# Patient Record
Sex: FEMALE | Race: WHITE | NOT HISPANIC OR LATINO | Employment: OTHER | URBAN - METROPOLITAN AREA
[De-identification: names, ages, dates, MRNs, and addresses within clinical notes are randomized per-mention and may not be internally consistent; named-entity substitution may affect disease eponyms.]

---

## 2022-03-08 ENCOUNTER — APPOINTMENT (OUTPATIENT)
Dept: GENERAL RADIOLOGY | Facility: HOSPITAL | Age: 72
End: 2022-03-08

## 2022-03-08 ENCOUNTER — APPOINTMENT (OUTPATIENT)
Dept: MRI IMAGING | Facility: HOSPITAL | Age: 72
End: 2022-03-08

## 2022-03-08 ENCOUNTER — APPOINTMENT (OUTPATIENT)
Dept: CT IMAGING | Facility: HOSPITAL | Age: 72
End: 2022-03-08

## 2022-03-08 ENCOUNTER — HOSPITAL ENCOUNTER (INPATIENT)
Facility: HOSPITAL | Age: 72
LOS: 2 days | Discharge: NURSING, REHAB OR LONG-TERM CARE | End: 2022-03-11
Attending: EMERGENCY MEDICINE | Admitting: FAMILY MEDICINE

## 2022-03-08 DIAGNOSIS — R53.1 ACUTE RIGHT-SIDED WEAKNESS: Primary | ICD-10-CM

## 2022-03-08 DIAGNOSIS — G47.00 INSOMNIA, UNSPECIFIED TYPE: ICD-10-CM

## 2022-03-08 PROBLEM — I10 ESSENTIAL HYPERTENSION: Status: ACTIVE | Noted: 2022-03-08

## 2022-03-08 PROBLEM — E11.9 DM2 (DIABETES MELLITUS, TYPE 2): Status: ACTIVE | Noted: 2022-03-08

## 2022-03-08 LAB
ALT SERPL W P-5'-P-CCNC: 10 U/L (ref 1–33)
APTT PPP: 24.5 SECONDS (ref 22–39)
AST SERPL-CCNC: 10 U/L (ref 1–32)
BASE EXCESS BLDA CALC-SCNC: 3 MMOL/L (ref -5–5)
BASOPHILS # BLD AUTO: 0.05 10*3/MM3 (ref 0–0.2)
BASOPHILS NFR BLD AUTO: 0.5 % (ref 0–1.5)
CA-I BLDA-SCNC: 1.19 MMOL/L (ref 1.2–1.32)
CO2 BLDA-SCNC: 27 MMOL/L (ref 24–29)
CREAT BLDA-MCNC: 1 MG/DL (ref 0.6–1.3)
DEPRECATED RDW RBC AUTO: 39.5 FL (ref 37–54)
EOSINOPHIL # BLD AUTO: 0.05 10*3/MM3 (ref 0–0.4)
EOSINOPHIL NFR BLD AUTO: 0.5 % (ref 0.3–6.2)
ERYTHROCYTE [DISTWIDTH] IN BLOOD BY AUTOMATED COUNT: 12.7 % (ref 12.3–15.4)
GLUCOSE BLDC GLUCOMTR-MCNC: 131 MG/DL (ref 70–130)
GLUCOSE BLDC GLUCOMTR-MCNC: 142 MG/DL (ref 70–130)
HCO3 BLDA-SCNC: 26.4 MMOL/L (ref 22–26)
HCT VFR BLD AUTO: 36.9 % (ref 34–46.6)
HCT VFR BLDA CALC: 39 % (ref 38–51)
HGB BLD-MCNC: 12.9 G/DL (ref 12–15.9)
HGB BLDA-MCNC: 13.3 G/DL (ref 12–17)
HOLD SPECIMEN: NORMAL
IMM GRANULOCYTES # BLD AUTO: 0.05 10*3/MM3 (ref 0–0.05)
IMM GRANULOCYTES NFR BLD AUTO: 0.5 % (ref 0–0.5)
LYMPHOCYTES # BLD AUTO: 1.65 10*3/MM3 (ref 0.7–3.1)
LYMPHOCYTES NFR BLD AUTO: 15.9 % (ref 19.6–45.3)
MCH RBC QN AUTO: 30.2 PG (ref 26.6–33)
MCHC RBC AUTO-ENTMCNC: 35 G/DL (ref 31.5–35.7)
MCV RBC AUTO: 86.4 FL (ref 79–97)
MONOCYTES # BLD AUTO: 0.62 10*3/MM3 (ref 0.1–0.9)
MONOCYTES NFR BLD AUTO: 6 % (ref 5–12)
NEUTROPHILS NFR BLD AUTO: 7.93 10*3/MM3 (ref 1.7–7)
NEUTROPHILS NFR BLD AUTO: 76.6 % (ref 42.7–76)
NRBC BLD AUTO-RTO: 0 /100 WBC (ref 0–0.2)
PCO2 BLDA: 36 MM HG (ref 35–45)
PH BLDA: 7.47 PH UNITS (ref 7.35–7.6)
PLATELET # BLD AUTO: 356 10*3/MM3 (ref 140–450)
PMV BLD AUTO: 9.7 FL (ref 6–12)
PO2 BLDA: 40 MMHG (ref 80–105)
POTASSIUM BLDA-SCNC: 4.5 MMOL/L (ref 3.5–4.9)
QT INTERVAL: 384 MS
QTC INTERVAL: 467 MS
RBC # BLD AUTO: 4.27 10*6/MM3 (ref 3.77–5.28)
SAO2 % BLDA: 79 % (ref 95–98)
SODIUM BLD-SCNC: 139 MMOL/L (ref 138–146)
TROPONIN T SERPL-MCNC: <0.01 NG/ML (ref 0–0.03)
WBC NRBC COR # BLD: 10.35 10*3/MM3 (ref 3.4–10.8)
WHOLE BLOOD HOLD SPECIMEN: NORMAL
WHOLE BLOOD HOLD SPECIMEN: NORMAL

## 2022-03-08 PROCEDURE — G0378 HOSPITAL OBSERVATION PER HR: HCPCS

## 2022-03-08 PROCEDURE — 99223 1ST HOSP IP/OBS HIGH 75: CPT

## 2022-03-08 PROCEDURE — 82803 BLOOD GASES ANY COMBINATION: CPT

## 2022-03-08 PROCEDURE — 70498 CT ANGIOGRAPHY NECK: CPT

## 2022-03-08 PROCEDURE — 70496 CT ANGIOGRAPHY HEAD: CPT

## 2022-03-08 PROCEDURE — 84132 ASSAY OF SERUM POTASSIUM: CPT

## 2022-03-08 PROCEDURE — 99223 1ST HOSP IP/OBS HIGH 75: CPT | Performed by: HOSPITALIST

## 2022-03-08 PROCEDURE — 71045 X-RAY EXAM CHEST 1 VIEW: CPT

## 2022-03-08 PROCEDURE — 84295 ASSAY OF SERUM SODIUM: CPT

## 2022-03-08 PROCEDURE — 84484 ASSAY OF TROPONIN QUANT: CPT | Performed by: EMERGENCY MEDICINE

## 2022-03-08 PROCEDURE — 85730 THROMBOPLASTIN TIME PARTIAL: CPT | Performed by: EMERGENCY MEDICINE

## 2022-03-08 PROCEDURE — 93005 ELECTROCARDIOGRAM TRACING: CPT | Performed by: EMERGENCY MEDICINE

## 2022-03-08 PROCEDURE — 84450 TRANSFERASE (AST) (SGOT): CPT | Performed by: EMERGENCY MEDICINE

## 2022-03-08 PROCEDURE — 70450 CT HEAD/BRAIN W/O DYE: CPT

## 2022-03-08 PROCEDURE — 82947 ASSAY GLUCOSE BLOOD QUANT: CPT

## 2022-03-08 PROCEDURE — 0 IOPAMIDOL PER 1 ML: Performed by: EMERGENCY MEDICINE

## 2022-03-08 PROCEDURE — 0042T HC CT CEREBRAL PERFUSION W/WO CONTRAST: CPT

## 2022-03-08 PROCEDURE — 84460 ALANINE AMINO (ALT) (SGPT): CPT | Performed by: EMERGENCY MEDICINE

## 2022-03-08 PROCEDURE — 70551 MRI BRAIN STEM W/O DYE: CPT

## 2022-03-08 PROCEDURE — 85025 COMPLETE CBC W/AUTO DIFF WBC: CPT | Performed by: EMERGENCY MEDICINE

## 2022-03-08 PROCEDURE — 92523 SPEECH SOUND LANG COMPREHEN: CPT

## 2022-03-08 PROCEDURE — 99285 EMERGENCY DEPT VISIT HI MDM: CPT

## 2022-03-08 PROCEDURE — 82330 ASSAY OF CALCIUM: CPT

## 2022-03-08 PROCEDURE — 82565 ASSAY OF CREATININE: CPT

## 2022-03-08 PROCEDURE — 92610 EVALUATE SWALLOWING FUNCTION: CPT

## 2022-03-08 PROCEDURE — 85014 HEMATOCRIT: CPT

## 2022-03-08 RX ORDER — HYDROCODONE BITARTRATE AND ACETAMINOPHEN 7.5; 325 MG/1; MG/1
1 TABLET ORAL EVERY 4 HOURS PRN
Status: ON HOLD | COMMUNITY
End: 2022-03-09

## 2022-03-08 RX ORDER — ASPIRIN 81 MG/1
81 TABLET, CHEWABLE ORAL DAILY
Status: DISCONTINUED | OUTPATIENT
Start: 2022-03-08 | End: 2022-03-11 | Stop reason: HOSPADM

## 2022-03-08 RX ORDER — HYDROCHLOROTHIAZIDE 12.5 MG/1
12.5 TABLET ORAL DAILY
Status: DISCONTINUED | OUTPATIENT
Start: 2022-03-08 | End: 2022-03-11 | Stop reason: HOSPADM

## 2022-03-08 RX ORDER — ONDANSETRON 2 MG/ML
4 INJECTION INTRAMUSCULAR; INTRAVENOUS EVERY 6 HOURS PRN
Status: DISCONTINUED | OUTPATIENT
Start: 2022-03-08 | End: 2022-03-11 | Stop reason: HOSPADM

## 2022-03-08 RX ORDER — ZOLPIDEM TARTRATE 5 MG/1
5 TABLET ORAL NIGHTLY PRN
Status: ON HOLD | COMMUNITY
End: 2022-03-09

## 2022-03-08 RX ORDER — ZOLPIDEM TARTRATE 5 MG/1
5 TABLET ORAL NIGHTLY PRN
Status: DISCONTINUED | OUTPATIENT
Start: 2022-03-08 | End: 2022-03-11 | Stop reason: HOSPADM

## 2022-03-08 RX ORDER — CLOPIDOGREL BISULFATE 75 MG/1
300 TABLET ORAL ONCE
Status: COMPLETED | OUTPATIENT
Start: 2022-03-08 | End: 2022-03-08

## 2022-03-08 RX ORDER — SODIUM CHLORIDE 0.9 % (FLUSH) 0.9 %
10 SYRINGE (ML) INJECTION EVERY 12 HOURS SCHEDULED
Status: DISCONTINUED | OUTPATIENT
Start: 2022-03-08 | End: 2022-03-11 | Stop reason: HOSPADM

## 2022-03-08 RX ORDER — GLIPIZIDE 2.5 MG/1
2.5 TABLET, EXTENDED RELEASE ORAL 2 TIMES DAILY
COMMUNITY

## 2022-03-08 RX ORDER — SODIUM CHLORIDE 0.9 % (FLUSH) 0.9 %
10 SYRINGE (ML) INJECTION AS NEEDED
Status: DISCONTINUED | OUTPATIENT
Start: 2022-03-08 | End: 2022-03-11 | Stop reason: HOSPADM

## 2022-03-08 RX ORDER — MELATONIN
2000 DAILY
Status: DISCONTINUED | OUTPATIENT
Start: 2022-03-08 | End: 2022-03-11 | Stop reason: HOSPADM

## 2022-03-08 RX ORDER — HYDROCHLOROTHIAZIDE 12.5 MG/1
12.5 TABLET ORAL DAILY
COMMUNITY

## 2022-03-08 RX ORDER — ONDANSETRON 4 MG/1
4 TABLET, FILM COATED ORAL EVERY 6 HOURS PRN
Status: DISCONTINUED | OUTPATIENT
Start: 2022-03-08 | End: 2022-03-11 | Stop reason: HOSPADM

## 2022-03-08 RX ORDER — SODIUM CHLORIDE 0.9 % (FLUSH) 0.9 %
10 SYRINGE (ML) INJECTION EVERY 12 HOURS SCHEDULED
Status: DISCONTINUED | OUTPATIENT
Start: 2022-03-08 | End: 2022-03-09

## 2022-03-08 RX ORDER — ACETAMINOPHEN 325 MG/1
650 TABLET ORAL EVERY 4 HOURS PRN
Status: DISCONTINUED | OUTPATIENT
Start: 2022-03-08 | End: 2022-03-11 | Stop reason: HOSPADM

## 2022-03-08 RX ORDER — CLOPIDOGREL BISULFATE 75 MG/1
75 TABLET ORAL DAILY
Status: DISCONTINUED | OUTPATIENT
Start: 2022-03-09 | End: 2022-03-11 | Stop reason: HOSPADM

## 2022-03-08 RX ORDER — HYDROCODONE BITARTRATE AND ACETAMINOPHEN 7.5; 325 MG/1; MG/1
1 TABLET ORAL EVERY 4 HOURS PRN
Status: DISCONTINUED | OUTPATIENT
Start: 2022-03-08 | End: 2022-03-11 | Stop reason: HOSPADM

## 2022-03-08 RX ORDER — ACETAMINOPHEN 650 MG/1
650 SUPPOSITORY RECTAL EVERY 4 HOURS PRN
Status: DISCONTINUED | OUTPATIENT
Start: 2022-03-08 | End: 2022-03-11 | Stop reason: HOSPADM

## 2022-03-08 RX ORDER — PANTOPRAZOLE SODIUM 40 MG/1
40 TABLET, DELAYED RELEASE ORAL EVERY MORNING
Status: DISCONTINUED | OUTPATIENT
Start: 2022-03-09 | End: 2022-03-11 | Stop reason: HOSPADM

## 2022-03-08 RX ORDER — OMEPRAZOLE 20 MG/1
20 CAPSULE, DELAYED RELEASE ORAL 2 TIMES DAILY
COMMUNITY
End: 2022-03-11 | Stop reason: HOSPADM

## 2022-03-08 RX ORDER — ACETAMINOPHEN 160 MG/5ML
650 SOLUTION ORAL EVERY 4 HOURS PRN
Status: DISCONTINUED | OUTPATIENT
Start: 2022-03-08 | End: 2022-03-11 | Stop reason: HOSPADM

## 2022-03-08 RX ORDER — ATORVASTATIN CALCIUM 40 MG/1
80 TABLET, FILM COATED ORAL NIGHTLY
Status: DISCONTINUED | OUTPATIENT
Start: 2022-03-08 | End: 2022-03-11 | Stop reason: HOSPADM

## 2022-03-08 RX ORDER — LOSARTAN POTASSIUM 50 MG/1
50 TABLET ORAL DAILY
COMMUNITY

## 2022-03-08 RX ORDER — ASPIRIN 300 MG/1
300 SUPPOSITORY RECTAL DAILY
Status: DISCONTINUED | OUTPATIENT
Start: 2022-03-08 | End: 2022-03-11 | Stop reason: HOSPADM

## 2022-03-08 RX ORDER — MELATONIN
2000 DAILY
COMMUNITY

## 2022-03-08 RX ADMIN — ASPIRIN 81 MG 81 MG: 81 TABLET ORAL at 13:06

## 2022-03-08 RX ADMIN — Medication 10 ML: at 20:01

## 2022-03-08 RX ADMIN — Medication 2000 UNITS: at 16:33

## 2022-03-08 RX ADMIN — ATORVASTATIN CALCIUM 80 MG: 40 TABLET, FILM COATED ORAL at 20:01

## 2022-03-08 RX ADMIN — CLOPIDOGREL BISULFATE 300 MG: 75 TABLET ORAL at 13:06

## 2022-03-08 RX ADMIN — IOPAMIDOL 125 ML: 755 INJECTION, SOLUTION INTRAVENOUS at 12:23

## 2022-03-08 RX ADMIN — HYDROCHLOROTHIAZIDE 12.5 MG: 12.5 CAPSULE ORAL at 16:32

## 2022-03-08 NOTE — ED PROVIDER NOTES
EMERGENCY DEPARTMENT ENCOUNTER    Pt Name: Danna Dickinson  MRN: [id number]  Pt :   1950  Room Number:  15/15  Date of encounter:  3/8/2022  PCP: Alvarado Ferrell MD  ED Provider: Tre Borrego MD    Historian: Patient and paramedics      HPI:  Chief Complaint: Right-sided weakness        Context: Danna Dickinson is a 72 y.o. female who presents to the ED c/o right-sided weakness which occurred sometime during the night.  The patient got up to use the restroom around 3 AM and felt generally weak.  She had a hard time getting back to bed.  She is uncertain whether she had unilateral weakness at that point.  This morning at 9 AM she was noted to have right facial droop by her  and has right sided arm and leg weakness.  911 was activated and paramedics brought her to our facility.  They report that her weakness has slightly improved in route our facility.  The patient has no history of CVA.  She reports she takes no blood thinners to include aspirin.  She denies headache, visual abnormalities, speech abnormalities, swallowing abnormalities.      PAST MEDICAL HISTORY  Past Medical History:   Diagnosis Date   • Diabetes mellitus (HCC)    • Hypertension          PAST SURGICAL HISTORY  Past Surgical History:   Procedure Laterality Date   • APPENDECTOMY     • HYSTERECTOMY     • TONSILLECTOMY           FAMILY HISTORY  History reviewed. No pertinent family history.      SOCIAL HISTORY  Social History     Socioeconomic History   • Marital status:    Tobacco Use   • Smoking status: Never Smoker   • Smokeless tobacco: Never Used   Substance and Sexual Activity   • Alcohol use: Not Currently   • Drug use: Not Currently         ALLERGIES  Lisinopril        REVIEW OF SYSTEMS  Review of Systems       All systems reviewed and negative except for those discussed in HPI.       PHYSICAL EXAM    I have reviewed the triage vital signs and nursing notes.    ED Triage Vitals   Temp Pulse Resp BP SpO2   -- -- -- -- --       Temp src Heart Rate Source Patient Position BP Location FiO2 (%)   -- -- -- -- --       Physical Exam  GENERAL:   Appears nontoxic as I meet her in the hallway in route to the CT scanner with paramedics.  HENT: Nares patent.  EYES: No scleral icterus.  CV: Regular rhythm, regular rate.  No carotid bruits.  No murmurs gallops rubs.  RESPIRATORY: Normal effort.  No audible wheezes, rales or rhonchi.  Clear to auscultation.  ABDOMEN: Soft, nontender  MUSCULOSKELETAL: No deformities.   NEURO: Right arm and leg drift.  See stroke navigator note for detailed NIH stroke score.  Alert, moves all extremities, follows commands.  SKIN: Warm, dry, no rash visualized.        LAB RESULTS  Recent Results (from the past 24 hour(s))   POC Creatinine    Collection Time: 03/08/22 12:05 PM    Specimen: Blood   Result Value Ref Range    Creatinine 1.00 0.60 - 1.30 mg/dL   POC Surgery Labs    Collection Time: 03/08/22 12:06 PM    Specimen: Blood   Result Value Ref Range    Ionized Calcium 1.19 (L) 1.20 - 1.32 mmol/L    POC Potassium 4.5 3.5 - 4.9 mmol/L    Sodium 139 138 - 146 mmol/L    Total CO2 27 24 - 29 mmol/L    Hemoglobin 13.3 12.0 - 17.0 g/dL    Hematocrit 39 38 - 51 %    pCO2, Arterial 36.0 35 - 45 mm Hg    pO2, Arterial 40 (L) 80 - 105 mmHg    Base Excess 3.0000 -5 - 5 mmol/L    O2 Saturation, Arterial 79 (L) 95 - 98 %    pH, Arterial 7.47 7.35 - 7.6 pH units    HCO3, Arterial 26.4 (H) 22 - 26 mmol/L    Glucose 142 (H) 70 - 130 mg/dL   aPTT    Collection Time: 03/08/22 12:08 PM    Specimen: Blood   Result Value Ref Range    PTT 24.5 22.0 - 39.0 seconds   Light Blue Top    Collection Time: 03/08/22 12:08 PM   Result Value Ref Range    Extra Tube hold for add-on    ECG 12 Lead    Collection Time: 03/08/22 12:36 PM   Result Value Ref Range    QT Interval 384 ms    QTC Interval 467 ms       If labs were ordered, I independently reviewed the results.        RADIOLOGY  XR Chest 1 View    Result Date: 3/8/2022  DATE OF EXAM: 3/8/2022  12:47 PM  PROCEDURE: XR CHEST 1 VW-  INDICATIONS: Acute Stroke Protocol (onset < 12 hrs)  COMPARISON: No comparisons available.  TECHNIQUE: Single radiographic AP view of the chest was obtained.  FINDINGS: The heart is not definitely enlarged. It looks like there is a hiatal hernia. There are no infiltrates or obvious pleural effusions. The visualized osseous structures do not appear unusual.      1.  There is some cardiomegaly. 2.  Suggested hiatal hernia.  This report was finalized on 3/8/2022 12:59 PM by Kevin Olson MD.      CT Head Without Contrast Stroke Protocol, CT Angiogram Head w AI Analysis of LVO, CT CEREBRAL PERFUSION WITH & WITHOUT CONTRAST, CT Angiogram Neck    Result Date: 3/8/2022  DATE OF EXAM: 3/8/2022 12:07 PM  PROCEDURE: CT CEREBRAL PERFUSION W WO CONTRAST-, CT ANGIOGRAM NECK-, CT ANGIOGRAM HEAD W AI ANALYSIS OF LVO-, CT HEAD WO CONTRAST STROKE PROTOCOL-  INDICATIONS: Neuro deficit, acute, stroke suspected  COMPARISON: No comparisons available.  TECHNIQUE: Routine transaxial cuts were obtained through the head without administration of contrast. Routine transaxial cuts were then obtained through the head following the intravenous administration of 125 mL of Isovue 300. Core blood volume, core blood flow, mean transit time, and Tmax images were obtained utilizing the Rapid software protocol. A limited CT angiogram of the head was also performed to measure the blood vessel density. Additional postcontrast images were obtained through the head and neck.3-D volume rendered reconstructed images were created and reviewed along with the source images  The radiation dose reduction device was turned on for each scan per the ALARA (As Low as Reasonably Achievable) protocol.  FINDINGS: CT head: There are suggested white matter changes involving the cerebral hemispheres that could relate to more chronic small vessel ischemic change. Looks like there may be an old lacunar type infarct in the right basal  ganglia. There some hard plaque noted in the area of the left M1 segment. Perfusion:  CBF less than 30% 0 cc Tmax greater than 6 seconds: 0 cc Mismatch volume 0 cc  CTA head and neck: There is some hard plaque in the area of the carotid bulbs. There does not appear to be a significant stenosis by NASCET criteria. The vertebral arteries seem codominant. On evaluation of the intracranial vasculature the basilar artery is grossly unremarkable. There is somewhat of a beading appearance to the right posterior cerebral artery. The findings do result in some narrowing of the vessel particularly P2 segment. This finding is nonspecific. This could be seen with a vasculitis or fibromuscular dysplasia. The left posterior cerebral artery seems patent without a similar appearance. The anterior cerebral arteries and right middle cerebral artery are grossly unremarkable. There is hard plaque seen involving the M1 segment on the left. This does result in at least some underlying narrowing in this area. It looks like there additionally some narrowing of the more distal left middle cerebral artery around the M2 area.  Nonvascular findings: There are radiopaque densities in the preseptal left periorbital area and along the scalp in the left frontal region. There is asymmetry to the appearance of the vallecula which is less aerated as compared to the right side of uncertain significance. It looks like there is an old alyse 's fracture involving the spinous process of C7.      1.  No significant perfusion abnormality. 2.  Abnormal appearance to the right posterior cerebral artery that might be reflective of a vasculitis or fibromuscular dysplasia. 3.  Hard plaque in the area of the left M1 segment resulting in some narrowing within this area. There additionally is narrowing of the proximal left M2 segment. 4.  Asymmetry in appearance of the vallecula on the left as compared to the right of questionable significance. 5.  Old alyse  's fracture C7 6.  White matter changes involving the cerebral hemispheres which could reflect more chronic small vessel ischemic change. It looks like there is old lacunar type infarction right basal ganglia. 7.  There are densities noted within the scalp in the left frontal area and in the preseptal left periorbital region that could reflect small foreign bodies and may relate to old trauma.  This report was finalized on 3/8/2022 12:52 PM by Kevin Olson MD.        I ordered and reviewed the above noted radiographic studies.      I viewed images of CT head which showed no acute bleed per my independent interpretation.    See radiologist's dictation for official interpretation.        PROCEDURES    Critical Care  Performed by: Tre Brorego MD  Authorized by: Tre Borrego MD     Critical care provider statement:     Critical care time (minutes):  30    Critical care time was exclusive of:  Separately billable procedures and treating other patients    Critical care was necessary to treat or prevent imminent or life-threatening deterioration of the following conditions:  CNS failure or compromise    Critical care was time spent personally by me on the following activities:  Ordering and performing treatments and interventions, ordering and review of laboratory studies, ordering and review of radiographic studies, pulse oximetry, re-evaluation of patient's condition, review of old charts, obtaining history from patient or surrogate, examination of patient, evaluation of patient's response to treatment, discussions with consultants and development of treatment plan with patient or surrogate  Comments:      I evaluated the patient for possible intravenous thrombolysis as well as Cath Lab intervention.  The patient does not meet criteria for either.  Further stroke work-up is in process.        ECG 12 Lead   Final Result   Test Reason : STROKE   Blood Pressure :   */*   mmHG   Vent. Rate :  89 BPM      Atrial Rate :  89 BPM      P-R Int : 166 ms          QRS Dur :  72 ms       QT Int : 384 ms       P-R-T Axes :  42   5  14 degrees      QTc Int : 467 ms      Normal sinus rhythm   Possible Inferior infarct , age undetermined   Abnormal ECG   No previous ECGs available   Confirmed by JUANITA MARTINEZ MD ([id number]) on 3/8/2022 1:33:29 PM      Referred By: EDMD           Confirmed By: JUANITA MARTINEZ MD          MEDICATIONS GIVEN IN ER    Medications   sodium chloride 0.9 % flush 10 mL (has no administration in time range)   aspirin chewable tablet 81 mg (81 mg Oral Given 3/8/22 1306)     Or   aspirin suppository 300 mg ( Rectal Not Given:  See Alt 3/8/22 1306)   clopidogrel (PLAVIX) tablet 300 mg (300 mg Oral Given 3/8/22 1306)     And   clopidogrel (PLAVIX) tablet 75 mg (has no administration in time range)   iopamidol (ISOVUE-370) 76 % injection 150 mL (125 mL Intravenous Given 3/8/22 1223)         PROGRESS, DATA ANALYSIS, CONSULTS, AND MEDICAL DECISION MAKING    All labs have been independently reviewed by me.  All radiology studies have been reviewed by me and the radiologist dictating the report.   EKG's have been independently viewed and interpreted by me.      Differential diagnoses: Acute CVA versus TIA versus other etiology.      ED Course as of 03/08/22 1333   Tue Mar 08, 2022   1322 I evaluated the patient immediately upon arrival and helped coordinate her care in the CT scanner and afterward.  I have consulted with the stroke team who will follow in house.  I paged the hospitalist for admission. [MS]   1324 I spoke with Dr. Pan.  Case discussed in detail including discussion of CT scan abnormalities which were passed along to me by Dr. Valencia of radiology. [MS]   1327 I have updated the stroke navigator about the object seen by Dr. Valencia.  They will evaluate further prior to MR consideration. [MS]      ED Course User Index  [MS] Juanita Martinez MD             AS OF 13:33 EST VITALS:    BP - 145/69  HR - 78  TEMP  - 98.1 °F (36.7 °C) (Oral)  O2 SATS - 97%                  DIAGNOSIS  Final diagnoses:   Acute right-sided weakness         DISPOSITION  Admission           Tre Borrego MD  03/08/22 3329

## 2022-03-08 NOTE — SIGNIFICANT NOTE
MRI brain without contrast was reviewed.  Acute stroke in the left posterior limb of internal capsule.  Since patient has had waxing and waning symptoms which have improved we will keep her on bed rest overnight with HOB <30 degrees.  Hypotension should be avoided and nursing should call stroke APRN should patient have worsening of symptoms.     Notified primary RN, the patient, and her spouse.  All questions were answered and we had a lengthy discussion about medication regimen and importance of lifestyle modifications in prevention of future cerebrovascular events.  Patient voices her understanding and is agreeable to plan.    VALENCIA Altman

## 2022-03-08 NOTE — PLAN OF CARE
Goal Outcome Evaluation:  Plan of Care Reviewed With: patient    SLP evaluation completed. Will sign-off as no deficits were identified with speech, language, cognition or swallowing at this time. Please see note for further details and recommendations.

## 2022-03-08 NOTE — THERAPY DISCHARGE NOTE
Acute Care - Speech Language Pathology Initial Evaluation/Discharge  [facility]   Cognitive-Communication Evaluation       Patient Name: Danna Dickinson  : 1950  MRN: [id number]  Today's Date: 3/8/2022               Admit Date: 3/8/2022     Visit Dx:    ICD-10-CM ICD-9-CM   1. Acute right-sided weakness  R53.1 728.87     Patient Active Problem List   Diagnosis   • Acute right-sided weakness   • DM2 (diabetes mellitus, type 2) (HCC)   • Essential hypertension     Past Medical History:   Diagnosis Date   • Diabetes mellitus (HCC)    • Hypertension      Past Surgical History:   Procedure Laterality Date   • APPENDECTOMY     • HYSTERECTOMY     • TONSILLECTOMY         SLP Recommendation and Plan  SLP Diagnosis: No deficits identified with speech, language or cognition at this time (22 125)     Swallow Criteria for Skilled Therapeutic Interventions Met: no problems identified which require skilled intervention (22 125)  SLC Criteria for Skilled Therapy Interventions Met: no problems identified which require skilled intervention, baseline status (22)  Anticipated Discharge Disposition (SLP): home (22)  Therapy Frequency (Swallow): evaluation only (22 125)                 Reason for Discharge: all goals and outcomes met, no further needs identified (22)                  SLP EVALUATION (last 72 hours)     SLP SLC Evaluation     Row Name 22                   Communication Assessment/Intervention    Document Type evaluation  -CH        Subjective Information no complaints  -CH        Patient Observations alert;cooperative;agree to therapy  -CH        Patient/Family/Caregiver Comments/Observations spouse present  -CH        Patient Effort good  -CH        Symptoms Noted During/After Treatment none  -CH                  General Information    Patient Profile Reviewed yes  -CH        Pertinent History Of Current Problem Patient admitted on stroke pathway with  R weakness/facial droop and slurred speech. SLC and CSe evaluations completed per stroke protocol.  -CH        Precautions/Limitations, Vision WFL;for purposes of eval  -CH        Precautions/Limitations, Hearing for purposes of eval  -CH        Prior Level of Function-Communication WFL  -CH        Plans/Goals Discussed with patient;agreed upon  -        Barriers to Rehab none identified  -        Patient's Goals for Discharge return to home;return to all previous roles/activities  -                  Pain    Additional Documentation Pain Scale: FACES Pre/Post-Treatment (Group)  -                  Pain Scale: FACES Pre/Post-Treatment    Pain: FACES Scale, Pretreatment 0-->no hurt  -CH        Posttreatment Pain Rating 0-->no hurt  -CH                  Comprehension Assessment/Intervention    Comprehension Assessment/Intervention Auditory Comprehension;Reading Comprehension  -                  Auditory Comprehension Assessment/Intervention    Auditory Comprehension (Communication) WNL  -CH                  Reading Comprehension Assessment/Intervention    Reading Comprehension (Communication) WNL  -CH        Functional Reading Tasks WNL  -CH                  Expression Assessment/Intervention    Expression Assessment/Intervention verbal expression  -CH                  Verbal Expression Assessment/Intervention    Verbal Expression WNL  -CH        Automatic Speech (Communication) response to greeting;WNL  -CH        Phrase Completion unpredictable;WNL  -CH        Responsive Naming complex;WNL  -CH        Confrontational Naming low frequency;WNL  -CH        Spontaneous/Functional Words complex;WNL  -CH        Sentence Formulation complex;WNL  -CH        Conversational Discourse/Fluency WNL  -CH                  Oral Motor Structure and Function    Oral Motor Structure and Function WNL  -CH        Dentition Assessment natural, present and adequate  -        Mucosal Quality moist, healthy  -CH                   Oral Musculature and Cranial Nerve Assessment    Oral Motor General Assessment WF  -        Oral Labial or Buccal Impairment, Detail, Cranial Nerve VII (Facial): right labial droop  -                  Motor Speech Assessment/Intervention    Motor Speech Function WNL  -                  Cursory Voice Assessment/Intervention    Quality and Resonance (Voice) WNL  -                  Cognitive Assessment Intervention- SLP    Cognitive Function (Cognition) WNL  -        Orientation Status (Cognition) awareness of basic personal information;person;place;time;situation;WNL  -        Memory (Cognitive) functional;immediate;short-term;long-term;delayed;WNL  -        Attention (Cognitive) selective;sustained;attention to detail;WNL  -        Thought Organization (Cognitive) concrete divergent;abstract divergent;concrete convergent;abstract convergent;verbal sequencing;[facility]  -        Reasoning (Cognitive) simple;deductive;[facility]  -        Problem Solving (Cognitive) simple;temporal;WNL  -        Pragmatics (Communication) WNL  -                  SLP Evaluation Clinical Impressions    SLP Diagnosis No deficits identified with speech, language or cognition at this time  -        SLC Criteria for Skilled Therapy Interventions Met no problems identified which require skilled intervention;baseline status  -        Functional Impact no impact on function  -                  Recommendations    Therapy Frequency ([facility] SLC) evaluation only  -        Anticipated Discharge Disposition (SLP) home  -                  SLP Discharge Summary    Discharge Destination unknown  -        Discharge Diagnostic Statement No deficits identified with speech, language or cognition at this time  -        Progress Toward Achieving Short/long Term Goals discharge on same date as initial evaluation  -        Reason for Discharge all goals and outcomes met, no further needs identified  -              User Key  (r) =  Recorded By, (t) = Taken By, (c) = Cosigned By    Initials Name Effective Dates    Marianne Harris MS CCC-SLP 21 -                    EDUCATION  The patient has been educated in the following areas:   Cognitive Impairment Communication Impairment.                  Time Calculation:    Time Calculation- SLP     Row Name 22 1502             Time Calculation- SLP    SLP Start Time 1250  -CH      SLP Received On 22  -CH              Untimed Charges    SLP Eval/Re-eval  ST Eval Speech and Production w/ Language - 85883;ST Eval Oral Pharyng Swallow - 93370  -CH      41509-CY Eval Speech and Production w/ Language Minutes 40  -CH      40677-YP Eval Oral Pharyng Swallow Minutes 38  -CH              Total Minutes    Untimed Charges Total Minutes 78  -CH       Total Minutes 78  -CH            User Key  (r) = Recorded By, (t) = Taken By, (c) = Cosigned By    Initials Name Provider Type    Marianne Harris MS CCC-SLP Speech and Language Pathologist                Therapy Charges for Today     Code Description Service Date Service Provider Modifiers Qty    06391445532 HC ST EVAL ORAL PHARYNG SWALLOW 3 3/8/2022 Marianne Burris MS CCC-SLP GN 1    25340981960 HC ST EVAL SPEECH AND PROD W LANG  3 3/8/2022 Marianne Burris MS CCC-SLP GN 1                   SLP Discharge Summary  Anticipated Discharge Disposition (SLP): home  Reason for Discharge: all goals and outcomes met, no further needs identified  Progress Toward Achieving Short/long Term Goals: discharge on same date as initial evaluation  Discharge Destination: unknown    Marianne Burris MS CCC-SLP  3/8/2022 and Acute Care - Speech Language Pathology   Swallow Initial Evaluation/Discharge [facility]   Clinical Swallow Evaluation       Patient Name: Danna Dickinson  : 1950  MRN: [id number]  Today's Date: 3/8/2022               Admit Date: 3/8/2022    Visit Dx:    ICD-10-CM ICD-9-CM   1. Acute right-sided weakness  R53.1 728.87      Patient Active Problem List   Diagnosis   • Acute right-sided weakness   • DM2 (diabetes mellitus, type 2) (HCC)   • Essential hypertension     Past Medical History:   Diagnosis Date   • Diabetes mellitus (HCC)    • Hypertension      Past Surgical History:   Procedure Laterality Date   • APPENDECTOMY     • HYSTERECTOMY     • TONSILLECTOMY         SLP Recommendation and Plan  SLP Swallowing Diagnosis: swallow WFL (03/08/22 1250)  SLP Diet Recommendation: regular textures, thin liquids (03/08/22 1250)     Monitor for Signs of Aspiration: yes, notify SLP if any concerns (03/08/22 1250)     Swallow Criteria for Skilled Therapeutic Interventions Met: no problems identified which require skilled intervention (03/08/22 1250)  Anticipated Discharge Disposition (SLP): home (03/08/22 1250)     Therapy Frequency (Swallow): evaluation only (03/08/22 1250)              Anticipated Discharge Disposition (SLP): home (03/08/22 1250)        Reason for Discharge: all goals and outcomes met, no further needs identified (03/08/22 1250)                  SWALLOW EVALUATION (last 72 hours)     SLP Adult Swallow Evaluation     Row Name 03/08/22 1250                   General Information    Current Method of Nutrition NPO  -CH        Prior Level of Function-Communication WFL  -CH        Prior Level of Function-Swallowing no diet consistency restrictions  -CH        Patient's Goals for Discharge patient did not state  -CH                  Oral Motor Structure and Function    Secretion Management WNL/WFL  -CH        Volitional Swallow WFL  -CH                  General Eating/Swallowing Observations    Eating/Swallowing Skills self-fed;appropriate self-feeding skills observed  -CH        Positioning During Eating upright 90 degree;upright in bed  -CH        Utensils Used spoon;cup;straw  -CH        Consistencies Trialed ice chips;thin liquids;pureed;soft textures;regular textures  -CH        Pre SpO2 (%) 98  -CH        Post SpO2 (%) 99   -CH                  Respiratory    Respiratory Status WFL  -CH                  Clinical Swallow Eval    Oral Prep Phase WFL  -CH        Oral Transit WFL  -CH        Oral Residue WFL  -CH        Pharyngeal Phase no overt signs/symptoms of pharyngeal impairment  -        Esophageal Phase unremarkable  -        Clinical Swallow Evaluation Summary No overt s/s of aspiration or difficulty with any consistency trialed. Recommend regular diet and thin liquids.  -                  SLP Evaluation Clinical Impression    SLP Swallowing Diagnosis swallow WFL  -CH        Functional Impact no impact on function  -        Swallow Criteria for Skilled Therapeutic Interventions Met no problems identified which require skilled intervention  -                  Recommendations    Therapy Frequency (Swallow) evaluation only  -        SLP Diet Recommendation regular textures;thin liquids  -        Oral Care Recommendations Oral Care BID/PRN  -        SLP Rec. for Method of Medication Administration meds whole;with thin liquids;with pudding or applesauce;as tolerated  -        Monitor for Signs of Aspiration yes;notify SLP if any concerns  -              User Key  (r) = Recorded By, (t) = Taken By, (c) = Cosigned By    Initials Name Effective Dates     Marianne Burris, MS CCC-SLP 06/16/21 -                 EDUCATION  The patient has been educated in the following areas:   Dysphagia (Swallowing Impairment).                 Time Calculation:    Time Calculation- SLP     Row Name 03/08/22 1502             Time Calculation- [facility]    SLP Start Time 1250  -      SLP Received On 03/08/22  -              Untimed Charges    SLP Eval/Re-eval  ST Eval Speech and Production w/ Language - 40243;ST Eval Oral Pharyng Swallow - 30343  -      [id number]-RF Eval Speech and Production w/ Language Minutes 40  -CH      62715-EO Eval Oral Pharyng Swallow Minutes 38  -CH              Total Minutes    Untimed Charges Total Minutes 78   -CH       Total Minutes 78  -CH            User Key  (r) = Recorded By, (t) = Taken By, (c) = Cosigned By    Initials Name Provider Type    Marianne Harris MS CCC-SLP Speech and Language Pathologist                Therapy Charges for Today     Code Description Service Date Service Provider Modifiers Qty    35533312139 HC ST EVAL ORAL PHARYNG SWALLOW 3 3/8/2022 Marianne Burris MS CCC-SLP GN 1    [id number] HC ST EVAL SPEECH AND PROD W LANG  3 3/8/2022 Marianne Burris MS CCC-SLP GN 1               SLP Discharge Summary  Anticipated Discharge Disposition (SLP): home  Reason for Discharge: all goals and outcomes met, no further needs identified  Progress Toward Achieving Short/long Term Goals: discharge on same date as initial evaluation  Discharge Destination: unknown    Marianne Burris MS CCC-SLP  3/8/2022     Patient was not wearing a face mask and did not exhibit coughing during this therapy encounter.  Procedure performed was aerosolizing, involved close contact (within 6 feet for at least 15 minutes or longer), and did not involve contact with infectious secretions or specimens.  Therapist used appropriate personal protective equipment including gloves, standard procedure mask, eye protection and N95 mask.  Appropriate PPE was worn during the entire therapy session.  Hand hygiene was completed before and after therapy session.

## 2022-03-08 NOTE — H&P
[facility]  HISTORY AND PHYSICAL    Patient Name: Danna Dickinson  : 1950  MRN: [id number]  Primary Care Physician: Alvarado Ferrell MD  Date of admission: 3/8/2022      Subjective   Subjective     Chief Complaint:  Right sided weakness, right facial droop, and slurred speech    HPI:  Danna Dickinson is a 72 y.o. female with a past medical history of DM2 and hypertension that presented to the ED complaining of right sided weakness, right facial droop and slurred speech. Per her , she was well at 2330 last night, then woke up at 3:00 am this morning and did not feel herself. She complained of weakness and went back to bed. The patient woke back up around 9 am and the patient's  had a right facial droop with right arm/leg weakness and slurred speech. Symptoms improved slightly by the time EMS got the patient to the ED. Patient remained alert and oriented. Ongoing right facial droop is present along with right arm weakness/right leg weakness and loss of sensation to the right extremities. Dysarthric speech is also present. Stroke/neuro evaluated the patient in the ED. Hospitalists were contacted for admission.    COVID Details:    Symptoms:    [x] NONE [] Fever []  Cough [] Shortness of breath [] Change in taste/smell      Review of Systems   Gen- No fevers, chills  CV- No chest pain, palpitations  Resp- No cough, dyspnea  GI- No N/V/D, abd pain  Neuro- right facial droop, right sided weakness, slurred speech    All other systems reviewed and are negative.     Personal History     Past Medical History:   Diagnosis Date   • Diabetes mellitus (HCC)    • Hypertension        Past Surgical History:   Procedure Laterality Date   • APPENDECTOMY     • HYSTERECTOMY     • TONSILLECTOMY         Family History:  family history is not on file. Otherwise pertinent FHx was reviewed and unremarkable.     Social History:  reports that she has never smoked. She has never used  smokeless tobacco. She reports previous alcohol use. She reports previous drug use.  Social History     Social History Narrative   • Not on file       Medications:  Available home medication information reviewed.  Medications Prior to Admission   Medication Sig Dispense Refill Last Dose   • cholecalciferol (VITAMIN D3) 25 MCG (1000 UT) tablet Take 2,000 Units by mouth Daily.   3/8/2022 at Unknown time   • glipizide (GLUCOTROL XL) 2.5 MG 24 hr tablet Take 2.5 mg by mouth Daily.   3/8/2022 at Unknown time   • hydroCHLOROthiazide (HYDRODIURIL) 12.5 MG tablet Take 12.5 mg by mouth Daily.   3/8/2022 at Unknown time   • losartan (COZAAR) 50 MG tablet Take 50 mg by mouth Daily.   3/8/2022 at Unknown time   • omeprazole (priLOSEC) 20 MG capsule Take 20 mg by mouth 2 (Two) Times a Day.   3/8/2022 at Unknown time   • HYDROcodone-acetaminophen (NORCO) 7.5-325 MG per tablet Take 1 tablet by mouth Every 4 (Four) Hours As Needed for Moderate Pain .   More than a month at Unknown time   • zolpidem (AMBIEN) 5 MG tablet Take 5 mg by mouth At Night As Needed for Sleep.   More than a month at Unknown time       Allergies   Allergen Reactions   • Lisinopril Cough       Objective   Objective     Vital Signs:   Temp:  [98.1 °F (36.7 °C)] 98.1 °F (36.7 °C)  Heart Rate:  [69-92] 69  Resp:  [16-18] 16  BP: (145-164)/(65-79) 164/78  Total (NIH Stroke Scale): 8    Physical Exam   Constitutional: Awake, alert  Eyes: PERRLA, sclerae anicteric, no conjunctival injection, right facial droop  HENT: NCAT, mucous membranes moist  Neck: Supple, no thyromegaly, no lymphadenopathy, trachea midline  Respiratory: Clear to auscultation bilaterally, nonlabored respirations   Cardiovascular: RRR, no murmurs, rubs, or gallops, palpable pedal pulses bilaterally  Gastrointestinal: Positive bowel sounds, soft, nontender, nondistended  Musculoskeletal: No bilateral ankle edema, no clubbing or cyanosis to extremities  Psychiatric: Appropriate affect,  cooperative  Neurologic: Oriented x 3, strength 1/5 in RUE and RLE, normal strength in LUE and LLE, right facial droop present, dysarthric speech- no aphasia, right finger to nose abnormal  Skin: No rashes    Result Review:  I have personally reviewed the results from the time of this admission to 3/8/2022 14:35 EST and agree with these findings:  [x]  Laboratory  [x]  Microbiology  [x]  Radiology  []  EKG/Telemetry   []  Cardiology/Vascular   []  Pathology  []  Old records  []  Other:      LAB RESULTS:      Lab 03/08/22  1316 03/08/22  1208 03/08/22  1206   WBC 10.35  --   --    HEMOGLOBIN 12.9  --   --    HEMOGLOBIN, POC  --   --  13.3   HEMATOCRIT 36.9  --   --    HEMATOCRIT POC  --   --  39   PLATELETS 356  --   --    NEUTROS ABS 7.93*  --   --    IMMATURE GRANS (ABS) 0.05  --   --    LYMPHS ABS 1.65  --   --    MONOS ABS 0.62  --   --    EOS ABS 0.05  --   --    MCV 86.4  --   --    APTT  --  24.5  --          Lab 03/08/22  1205   CREATININE 1.00         Lab 03/08/22  1316   ALT (SGPT) 10   AST (SGOT) 10         Lab 03/08/22  1316   TROPONIN T <0.010                 Lab 03/08/22  1206   PH, ARTERIAL 7.47         Microbiology Results (last 10 days)     ** No results found for the last 240 hours. **          CT Angiogram Neck    Result Date: 3/8/2022  DATE OF EXAM: 3/8/2022 12:07 PM  PROCEDURE: CT CEREBRAL PERFUSION W WO CONTRAST-, CT ANGIOGRAM NECK-, CT ANGIOGRAM HEAD W AI ANALYSIS OF LVO-, CT HEAD WO CONTRAST STROKE PROTOCOL-  INDICATIONS: Neuro deficit, acute, stroke suspected  COMPARISON: No comparisons available.  TECHNIQUE: Routine transaxial cuts were obtained through the head without administration of contrast. Routine transaxial cuts were then obtained through the head following the intravenous administration of 125 mL of Isovue 300. Core blood volume, core blood flow, mean transit time, and Tmax images were obtained utilizing the Rapid software protocol. A limited CT angiogram of the head was also  performed to measure the blood vessel density. Additional postcontrast images were obtained through the head and neck.3-D volume rendered reconstructed images were created and reviewed along with the source images  The radiation dose reduction device was turned on for each scan per the ALARA (As Low as Reasonably Achievable) protocol.  FINDINGS: CT head: There are suggested white matter changes involving the cerebral hemispheres that could relate to more chronic small vessel ischemic change. Looks like there may be an old lacunar type infarct in the right basal ganglia. There some hard plaque noted in the area of the left M1 segment. Perfusion:  CBF less than 30% 0 cc Tmax greater than 6 seconds: 0 cc Mismatch volume 0 cc  CTA head and neck: There is some hard plaque in the area of the carotid bulbs. There does not appear to be a significant stenosis by NASCET criteria. The vertebral arteries seem codominant. On evaluation of the intracranial vasculature the basilar artery is grossly unremarkable. There is somewhat of a beading appearance to the right posterior cerebral artery. The findings do result in some narrowing of the vessel particularly P2 segment. This finding is nonspecific. This could be seen with a vasculitis or fibromuscular dysplasia. The left posterior cerebral artery seems patent without a similar appearance. The anterior cerebral arteries and right middle cerebral artery are grossly unremarkable. There is hard plaque seen involving the M1 segment on the left. This does result in at least some underlying narrowing in this area. It looks like there additionally some narrowing of the more distal left middle cerebral artery around the M2 area.  Nonvascular findings: There are radiopaque densities in the preseptal left periorbital area and along the scalp in the left frontal region. There is asymmetry to the appearance of the vallecula which is less aerated as compared to the right side of uncertain  significance. It looks like there is an old alyse 's fracture involving the spinous process of C7.      Impression: 1.  No significant perfusion abnormality. 2.  Abnormal appearance to the right posterior cerebral artery that might be reflective of a vasculitis or fibromuscular dysplasia. 3.  Hard plaque in the area of the left M1 segment resulting in some narrowing within this area. There additionally is narrowing of the proximal left M2 segment. 4.  Asymmetry in appearance of the vallecula on the left as compared to the right of questionable significance. 5.  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  White matter changes involving the cerebral hemispheres which could reflect more chronic small vessel ischemic change. It looks like there is old lacunar type infarction right basal ganglia. 7.  There are densities noted within the scalp in the left frontal area and in the preseptal left periorbital region that could reflect small foreign bodies and may relate to old trauma.  This report was finalized on 3/8/2022 12:52 PM by Kevin Olson MD.      XR Chest 1 View    Result Date: 3/8/2022  DATE OF EXAM: 3/8/2022 12:47 PM  PROCEDURE: XR CHEST 1 VW-  INDICATIONS: Acute Stroke Protocol (onset < 12 hrs)  COMPARISON: No comparisons available.  TECHNIQUE: Single radiographic AP view of the chest was obtained.  FINDINGS: The heart is not definitely enlarged. It looks like there is a hiatal hernia. There are no infiltrates or obvious pleural effusions. The visualized osseous structures do not appear unusual.      Impression: 1.  There is some cardiomegaly. 2.  Suggested hiatal hernia.  This report was finalized on 3/8/2022 12:59 PM by Kevin Olson MD.      CT Head Without Contrast Stroke Protocol    Result Date: 3/8/2022  DATE OF EXAM: 3/8/2022 12:07 PM  PROCEDURE: CT CEREBRAL PERFUSION W WO CONTRAST-, CT ANGIOGRAM NECK-, CT ANGIOGRAM HEAD W AI ANALYSIS OF LVO-, CT HEAD WO CONTRAST STROKE PROTOCOL-  INDICATIONS: Neuro deficit,  acute, stroke suspected  COMPARISON: No comparisons available.  TECHNIQUE: Routine transaxial cuts were obtained through the head without administration of contrast. Routine transaxial cuts were then obtained through the head following the intravenous administration of 125 mL of Isovue 300. Core blood volume, core blood flow, mean transit time, and Tmax images were obtained utilizing the Rapid software protocol. A limited CT angiogram of the head was also performed to measure the blood vessel density. Additional postcontrast images were obtained through the head and neck.3-D volume rendered reconstructed images were created and reviewed along with the source images  The radiation dose reduction device was turned on for each scan per the ALARA (As Low as Reasonably Achievable) protocol.  FINDINGS: CT head: There are suggested white matter changes involving the cerebral hemispheres that could relate to more chronic small vessel ischemic change. Looks like there may be an old lacunar type infarct in the right basal ganglia. There some hard plaque noted in the area of the left M1 segment. Perfusion:  CBF less than 30% 0 cc Tmax greater than 6 seconds: 0 cc Mismatch volume 0 cc  CTA head and neck: There is some hard plaque in the area of the carotid bulbs. There does not appear to be a significant stenosis by NASCET criteria. The vertebral arteries seem codominant. On evaluation of the intracranial vasculature the basilar artery is grossly unremarkable. There is somewhat of a beading appearance to the right posterior cerebral artery. The findings do result in some narrowing of the vessel particularly P2 segment. This finding is nonspecific. This could be seen with a vasculitis or fibromuscular dysplasia. The left posterior cerebral artery seems patent without a similar appearance. The anterior cerebral arteries and right middle cerebral artery are grossly unremarkable. There is hard plaque seen involving the M1 segment  on the left. This does result in at least some underlying narrowing in this area. It looks like there additionally some narrowing of the more distal left middle cerebral artery around the M2 area.  Nonvascular findings: There are radiopaque densities in the preseptal left periorbital area and along the scalp in the left frontal region. There is asymmetry to the appearance of the vallecula which is less aerated as compared to the right side of uncertain significance. It looks like there is an old alyse 's fracture involving the spinous process of C7.      Impression: 1.  No significant perfusion abnormality. 2.  Abnormal appearance to the right posterior cerebral artery that might be reflective of a vasculitis or fibromuscular dysplasia. 3.  Hard plaque in the area of the left M1 segment resulting in some narrowing within this area. There additionally is narrowing of the proximal left M2 segment. 4.  Asymmetry in appearance of the vallecula on the left as compared to the right of questionable significance. 5.  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  White matter changes involving the cerebral hemispheres which could reflect more chronic small vessel ischemic change. It looks like there is old lacunar type infarction right basal ganglia. 7.  There are densities noted within the scalp in the left frontal area and in the preseptal left periorbital region that could reflect small foreign bodies and may relate to old trauma.  This report was finalized on 3/8/2022 12:52 PM by Kevin Olson MD.      CT Angiogram Head w AI Analysis of LVO    Result Date: 3/8/2022  DATE OF EXAM: 3/8/2022 12:07 PM  PROCEDURE: CT CEREBRAL PERFUSION W WO CONTRAST-, CT ANGIOGRAM NECK-, CT ANGIOGRAM HEAD W AI ANALYSIS OF LVO-, CT HEAD WO CONTRAST STROKE PROTOCOL-  INDICATIONS: Neuro deficit, acute, stroke suspected  COMPARISON: No comparisons available.  TECHNIQUE: Routine transaxial cuts were obtained through the head without administration of  contrast. Routine transaxial cuts were then obtained through the head following the intravenous administration of 125 mL of Isovue 300. Core blood volume, core blood flow, mean transit time, and Tmax images were obtained utilizing the Rapid software protocol. A limited CT angiogram of the head was also performed to measure the blood vessel density. Additional postcontrast images were obtained through the head and neck.3-D volume rendered reconstructed images were created and reviewed along with the source images  The radiation dose reduction device was turned on for each scan per the ALARA (As Low as Reasonably Achievable) protocol.  FINDINGS: CT head: There are suggested white matter changes involving the cerebral hemispheres that could relate to more chronic small vessel ischemic change. Looks like there may be an old lacunar type infarct in the right basal ganglia. There some hard plaque noted in the area of the left M1 segment. Perfusion:  CBF less than 30% 0 cc Tmax greater than 6 seconds: 0 cc Mismatch volume 0 cc  CTA head and neck: There is some hard plaque in the area of the carotid bulbs. There does not appear to be a significant stenosis by NASCET criteria. The vertebral arteries seem codominant. On evaluation of the intracranial vasculature the basilar artery is grossly unremarkable. There is somewhat of a beading appearance to the right posterior cerebral artery. The findings do result in some narrowing of the vessel particularly P2 segment. This finding is nonspecific. This could be seen with a vasculitis or fibromuscular dysplasia. The left posterior cerebral artery seems patent without a similar appearance. The anterior cerebral arteries and right middle cerebral artery are grossly unremarkable. There is hard plaque seen involving the M1 segment on the left. This does result in at least some underlying narrowing in this area. It looks like there additionally some narrowing of the more distal left  middle cerebral artery around the M2 area.  Nonvascular findings: There are radiopaque densities in the preseptal left periorbital area and along the scalp in the left frontal region. There is asymmetry to the appearance of the vallecula which is less aerated as compared to the right side of uncertain significance. It looks like there is an old alyse 's fracture involving the spinous process of C7.      Impression: 1.  No significant perfusion abnormality. 2.  Abnormal appearance to the right posterior cerebral artery that might be reflective of a vasculitis or fibromuscular dysplasia. 3.  Hard plaque in the area of the left M1 segment resulting in some narrowing within this area. There additionally is narrowing of the proximal left M2 segment. 4.  Asymmetry in appearance of the vallecula on the left as compared to the right of questionable significance. 5.  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  White matter changes involving the cerebral hemispheres which could reflect more chronic small vessel ischemic change. It looks like there is old lacunar type infarction right basal ganglia. 7.  There are densities noted within the scalp in the left frontal area and in the preseptal left periorbital region that could reflect small foreign bodies and may relate to old trauma.  This report was finalized on 3/8/2022 12:52 PM by Kevin Olson MD.      CT CEREBRAL PERFUSION WITH & WITHOUT CONTRAST    Result Date: 3/8/2022  DATE OF EXAM: 3/8/2022 12:07 PM  PROCEDURE: CT CEREBRAL PERFUSION W WO CONTRAST-, CT ANGIOGRAM NECK-, CT ANGIOGRAM HEAD W AI ANALYSIS OF LVO-, CT HEAD WO CONTRAST STROKE PROTOCOL-  INDICATIONS: Neuro deficit, acute, stroke suspected  COMPARISON: No comparisons available.  TECHNIQUE: Routine transaxial cuts were obtained through the head without administration of contrast. Routine transaxial cuts were then obtained through the head following the intravenous administration of 125 mL of Isovue 300. Core blood  volume, core blood flow, mean transit time, and Tmax images were obtained utilizing the Rapid software protocol. A limited CT angiogram of the head was also performed to measure the blood vessel density. Additional postcontrast images were obtained through the head and neck.3-D volume rendered reconstructed images were created and reviewed along with the source images  The radiation dose reduction device was turned on for each scan per the ALARA (As Low as Reasonably Achievable) protocol.  FINDINGS: CT head: There are suggested white matter changes involving the cerebral hemispheres that could relate to more chronic small vessel ischemic change. Looks like there may be an old lacunar type infarct in the right basal ganglia. There some hard plaque noted in the area of the left M1 segment. Perfusion:  CBF less than 30% 0 cc Tmax greater than 6 seconds: 0 cc Mismatch volume 0 cc  CTA head and neck: There is some hard plaque in the area of the carotid bulbs. There does not appear to be a significant stenosis by NASCET criteria. The vertebral arteries seem codominant. On evaluation of the intracranial vasculature the basilar artery is grossly unremarkable. There is somewhat of a beading appearance to the right posterior cerebral artery. The findings do result in some narrowing of the vessel particularly P2 segment. This finding is nonspecific. This could be seen with a vasculitis or fibromuscular dysplasia. The left posterior cerebral artery seems patent without a similar appearance. The anterior cerebral arteries and right middle cerebral artery are grossly unremarkable. There is hard plaque seen involving the M1 segment on the left. This does result in at least some underlying narrowing in this area. It looks like there additionally some narrowing of the more distal left middle cerebral artery around the M2 area.  Nonvascular findings: There are radiopaque densities in the preseptal left periorbital area and along the  scalp in the left frontal region. There is asymmetry to the appearance of the vallecula which is less aerated as compared to the right side of uncertain significance. It looks like there is an old alyse 's fracture involving the spinous process of C7.      Impression: 1.  No significant perfusion abnormality. 2.  Abnormal appearance to the right posterior cerebral artery that might be reflective of a vasculitis or fibromuscular dysplasia. 3.  Hard plaque in the area of the left M1 segment resulting in some narrowing within this area. There additionally is narrowing of the proximal left M2 segment. 4.  Asymmetry in appearance of the vallecula on the left as compared to the right of questionable significance. 5.  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  White matter changes involving the cerebral hemispheres which could reflect more chronic small vessel ischemic change. It looks like there is old lacunar type infarction right basal ganglia. 7.  There are densities noted within the scalp in the left frontal area and in the preseptal left periorbital region that could reflect small foreign bodies and may relate to old trauma.  This report was finalized on 3/8/2022 12:52 PM by Kevin Olson MD.            Assessment/Plan   Assessment & Plan     Active Hospital Problems    Diagnosis  POA   • Acute right-sided weakness [R53.1]  Yes   • DM2 (diabetes mellitus, type 2) (HCC) [E11.9]  Yes   • Essential hypertension [I10]  Yes     TIA vs. Acute CVA  Acute right-sided weakness/right facial droop  - stroke/neuro following  - MRI brain w/o pending  - TTE with bubble study pending  - Aspirin 81mg PO daily started  - Loaded patient with 300mg of Plavix PO and then patient to take 75mg PO daily  - npo until bedside nursing swallow evaluation  - PT/OT/speech eval  - neurochecks    HTN  - hold home antihypertensives- Losartan- for permissive hypertension in setting of possible acute CVA    HLD  - lipid panel in am  - patient started  on Atorvastatin 80mg PO nightly    DM2  - ssi  - hold home PO meds  - HgA1C in am    DVT prophylaxis:  SCDs      CODE STATUS:  Full/CPR  Code Status and Medical Interventions:   Ordered at: 03/08/22 1432     Level Of Support Discussed With:    Patient     Code Status (Patient has no pulse and is not breathing):    CPR (Attempt to Resuscitate)     Medical Interventions (Patient has pulse or is breathing):    Full Support         Lolita Lazo DO  03/08/22

## 2022-03-08 NOTE — CONSULTS
"Stroke Consult Note    Patient Name: Danna Dickinson   MRN: [id number]  Age: 72 y.o.  Sex: female  : 1950    Primary Care Physician: Alvarado Ferrell MD  Referring Physician:  Tre Juarez MD    TIME STROKE TEAM CALLED: 11:39 EST     TIME PATIENT SEEN: 11:50 EST    Handedness: Left handed  Race:     Chief Complaint/Reason for Consultation: Right sided weakness, right facial droop, and slurred speech    Subjective .  HPI: Ms. Dickinson is a 72-year-old right-handed  female with a past medical history of diabetes mellitus type 2 and hypertension.  She presents to [facility] ED via EMS for further evaluation of right-sided weakness, right facial droop, and slurred speech.  Her LKW was yesterday at 11:30 PM.  She stated that she felt fine when she went to bed.  She woke up around 3:00 AM this morning and said she felt \"different.\"  She was unable to describe the feeling.  She says she felt weak and had a difficult time getting back to bed.  Around 9:00 AM this morning, her  noticed that she had a right facial droop with right arm and leg weakness.  The  stated that moments later she administrated slurred speech and had difficulty understanding her speech.   called 911 and EMS arrived.  While EMS was transporting her to the hospital, they stated that her symptoms were slightly improving but have not resolved.     Upon arrival to the ED, her initial blood pressure was 148/89 with a heart rate of 70 and on 2 L nasal cannula.  She was immediately taken to the CT scanner.  She was placed on CT scan and was assessed at this time.  She was alert and oriented to person, place, and time.  She was able to state her age and birthday.  She was able to follow commands.  Her visual field was normal and loss.  She did have a minor right facial palsy when she smiled.  Her tongue was midline with no deviation.  Her right arm has some effort against gravity.  When raising her arm she held it up for 1 " to 2 seconds and then it was fall down to her side.  The right leg had no effort against gravity and she was unable to raise right leg. She had right-sided sensory loss with difference in the right-side of face, arm, and leg. Her speech was moderately dysarthric and delayed when she spoke.  She had abnormal coordination on the right- side due to her weakness.    She stated that she does not take any aspirin or anticoagulation.  She says she is compliant with all medications.  She ambulates independently and lives independently with her .  She denies any history of stroke, high cholesterol, and smoking.     Last Known Normal Date/Time: 03/07/2022 22:30  EST     Review of Systems   Constitutional: Negative for fatigue and fever.   HENT: Negative for sore throat and trouble swallowing.    Eyes: Negative for redness and visual disturbance.   Respiratory: Negative for cough, chest tightness and shortness of breath.    Cardiovascular: Negative for chest pain and palpitations.   Gastrointestinal: Negative for constipation, diarrhea, nausea and vomiting.   Genitourinary: Negative for difficulty urinating.   Musculoskeletal: Negative for back pain and gait problem.   Skin: Negative for color change.   Neurological: Positive for facial asymmetry, speech difficulty and weakness. Negative for dizziness, numbness and headaches.   Psychiatric/Behavioral: Negative for agitation, behavioral problems and confusion.      Past Medical History:   Diagnosis Date   • Diabetes mellitus (HCC)    • Hypertension      Past Surgical History:   Procedure Laterality Date   • APPENDECTOMY     • HYSTERECTOMY     • TONSILLECTOMY       History reviewed. No pertinent family history.  Social History     Socioeconomic History   • Marital status:    Tobacco Use   • Smoking status: Never Smoker   • Smokeless tobacco: Never Used   Substance and Sexual Activity   • Alcohol use: Not Currently   • Drug use: Not Currently     Allergies    Allergen Reactions   • Lisinopril Cough     Prior to Admission medications    Not on File             Objective     Temp:  [98.1 °F (36.7 °C)] 98.1 °F (36.7 °C)  Heart Rate:  [76-92] 92  Resp:  [18] 18  BP: (152-160)/(65-79) 152/79  Neurological Exam  Mental Status  Awake and alert. Oriented to person, place and time. Oriented to person, place, and time. Memory is normal. Recent and remote memory are intact. Moderate dysarthria present. Language is fluent with no aphasia. Attention and concentration are normal. Fund of knowledge is appropriate for level of education.    Cranial Nerves  CN II: Right visual acuity: counts fingers. Left visual acuity: counts fingers. Right normal visual field. Left normal visual field.  CN III, IV, VI: Extraocular movements intact bilaterally. Normal lids and orbits bilaterally. Pupils equal round and reactive to light bilaterally.  CN V:  Right: Diminished sensation of the entire right side of the face.  Left: Facial sensation is normal on the left.  CN VII:  Right: There is central facial weakness.  Left: There is no facial weakness.  CN VIII: Bilateral hearing appears to be intact.  CN IX, X: Palate elevates symmetrically  CN XI:  Right: Sternocleidomastoid strength is weak. Trapezius strength is weak.  Left: Sternocleidomastoid strength is normal. Trapezius strength is normal.  CN XII: Tongue midline without atrophy or fasciculations.    Motor  Normal muscle bulk throughout. No fasciculations present. Normal muscle tone. No abnormal involuntary movements. Strength is 5/5 in all four extremities except as noted.  +1/5 right upper and lower extremities.    Sensory  Light touch abnormality: Decreased sensory on the right side.     Coordination  Right: Finger-to-nose abnormality: Right finger unable to touch her nose. Rapid alternating movement abnormality: Right fingers have some movement; movement is slow. Heel-to-shin abnormality: Unable to lift right heel to  shin.    Gait    Unable to assess.      Physical Exam  Vitals reviewed.   Constitutional:       General: She is awake. She is not in acute distress.     Appearance: She is obese.      Interventions: Nasal cannula in place.   HENT:      Head: Normocephalic and atraumatic.      Mouth/Throat:      Mouth: Mucous membranes are dry.   Eyes:      General: Lids are normal.      Extraocular Movements: Extraocular movements intact.      Pupils: Pupils are equal, round, and reactive to light.   Cardiovascular:      Rate and Rhythm: Normal rate and regular rhythm.      Pulses: Normal pulses.   Pulmonary:      Effort: Pulmonary effort is normal. No respiratory distress.   Musculoskeletal:      Cervical back: No tenderness.      Right lower leg: No edema.      Left lower leg: No edema.   Skin:     General: Skin is warm and dry.   Neurological:      Mental Status: She is alert and oriented to person, place, and time.      Cranial Nerves: Dysarthria and facial asymmetry present.      Sensory: Sensory deficit present.      Motor: Weakness present.      Coordination: Coordination abnormal.      Comments: Right side   Psychiatric:         Attention and Perception: Attention normal.         Mood and Affect: Mood normal.         Speech: Speech is delayed and slurred.         Behavior: Behavior is cooperative.         Cognition and Memory: Cognition and memory normal.         Acute Stroke Data    IV Thrombolytic (TPA/Tenecteplase) Inclusion / Exclusion Criteria    Time: 12:00 EST  Person Administering Scale: VALENCIA Navas    Inclusion Criteria  [x]   18 years of age or greater   []   Onset of symptoms < 4.5 hours before beginning treatment (stroke onset = time patient was last seen well or without symptoms).   []   Diagnosis of acute ischemic stroke causing measurable disabling deficit (Complete Hemianopia, Any Aphasia, Visual or Sensory Extinction, Any weakness limiting sustained effort against gravity)   []   Any remaining  deficit considered potentially disabling in view of patient and practitioner   Exclusion criteria (Do not proceed with Alteplase if any are checked under exclusion criteria)  [x]   Onset unknown or GREATER than 4.5 hours   []   ICH on CT/MRI   []   CT demonstrates hypodensity representing acute or subacute infarct   []   Significant head trauma or prior stroke in the previous 3 months   []   Symptoms suggestive of subarachnoid hemorrhage   []   History of un-ruptured intracranial aneurysm GREATER than 10 mm   []   Recent intracranial or intraspinal surgery within the last 3 months   []   Arterial puncture at a non-compressible site in the previous 7 days   []   Active internal bleeding   []   Acute bleeding tendency   []   Platelet count LESS than 100,000 for known hematological diseases such as leukemia, thrombocytopenia or chronic cirrhosis   []   Current use of anticoagulant with INR GREATER than 1.7 or PT GREATER than 15 seconds, aPTT GREATER than 40 seconds   []   Heparin received within 48 hours, resulting in abnormally elevated aPTT GREATER than upper limit of normal   []   Current use of direct thrombin inhibitors or direct factor Xa inhibitors in the past 48 hours   []   Elevated blood pressure refractory to treatment (systolic GREATER than 185 mm/Hg or diastolic  GREATER than 110 mm/Hg   []   Suspected infective endocarditis and aortic arch dissection   []   Current use of therapeutic treatment dose of low-molecular-weight heparin (LMWH) within the previous 24 hours   []   Structural GI malignancy or bleed   Relative exclusion for all patients  []   Only minor nondisabling symptoms   []   Pregnancy   []   Seizure at onset with postictal residual neurological impairments   []   Major surgery or previous trauma within past 14 days   []   History of previous spontaneous ICH, intracranial neoplasm, or AV malformation   []   Postpartum (within previous 14 days)   []   Recent GI or urinary tract hemorrhage  (within previous 21 days)   []   Recent acute MI (within previous 3 months)   []   History of unruptured intracranial aneurysm LESS than 10 mm   []   History of ruptured intracranial aneurysm   []   Blood glucose LESS than 50 mg/dL (2.7 mmol/L)   []   Dural puncture within the last 7 days   []   Known GREATER than 10 cerebral microbleeds   Additional exclusions for patients with symptoms onset between 3 and 4.5 hours.  []   Age > 80.   []   On any anticoagulants regardless of INR  >>> Warfarin (Coumadin), Heparin, Enoxaparin (Lovenox), fondaparinux (Arixtra), bivalirudin (Angiomax), Argatroban, dabigatran (Pradaxa), rivaroxaban (Xarelto), or apixaban (Eliquis)   []   Severe stroke (NIHSS > 25).   []   History of BOTH diabetes and previous ischemic stroke.   []   The risks and benefits have been discussed with the patient or family related to the administration of IV alteplase for stroke symptoms.   []   I have discussed and reviewed the patient's case and imaging with the attending prior to IV Thrombolytic (TPA/Tenecteplase).   LKW > 4.5 hours Time Thrombolytic administered       Hospital Meds:  Scheduled- aspirin, 81 mg, Oral, Daily   Or  aspirin, 300 mg, Rectal, Daily  clopidogrel, 300 mg, Oral, Once   And  [START ON 3/9/2022] clopidogrel, 75 mg, Oral, Daily      Infusions-     PRNs- sodium chloride    Functional Status Prior to Current Stroke/Weld Score: 0    NIH Stroke Scale  Time: 11:50 EST  Person Administering Scale: VALENCIA Navas  Interval: baseline  1a. Level of Consciousness: 0-->Alert, keenly responsive  1b. LOC Questions: 0-->Answers both questions correctly  1c. LOC Commands: 0-->Performs both tasks correctly  2. Best Gaze: 0-->Normal  3. Visual: 0-->No visual loss  4. Facial Palsy: 1-->Minor paralysis (flattened nasolabial fold, asymmetry on smiling)  5a. Motor Arm, Left: 0-->No drift, limb holds 90 (or 45) degrees for full 10 secs  5b. Motor Arm, Right: 2-->Some effort against gravity, limb  cannot get to or maintain (if cued) 90 (or 45) degrees, drifts down to bed, but has some effort against gravity  6a. Motor Leg, Left: 0-->No drift, leg holds 30 degree position for full 5 secs  6b. Motor Leg, Right: 3-->No effort against gravity, leg falls to bed immediately  7. Limb Ataxia: 0-->Absent  8. Sensory: 1-->Mild-to-moderate sensory loss, patient feels pinprick is less sharp or is dull on the affected side, or there is a loss of superficial pain with pinprick, but patient is aware of being touched  9. Best Language: 0-->No aphasia, normal  10. Dysarthria: 1-->Mild-to-moderate dysarthria, patient slurs at least some words and, at worst, can be understood with some difficulty  11. Extinction and Inattention (formerly Neglect): 0-->No abnormality    Total (NIH Stroke Scale): 8      Results Reviewed:  I have personally reviewed current lab, radiology, and data and agree with results.    Labs  Glucose: 142  Sodium: 139  Potassium: 4.5  Creatinine: 1.00  Ionized calcium: 1.19  ALT: 10  AST: 10  PTT: 24.5  WBC: 10.3   Hemoglobin: 12.9  Hematocrit: 36.9  Platelets: 356    CT Angiogram Neck    Result Date: 3/8/2022  1.  No significant perfusion abnormality. 2.  Abnormal appearance to the right posterior cerebral artery that might be reflective of a vasculitis or fibromuscular dysplasia. 3.  Hard plaque in the area of the left M1 segment resulting in some narrowing within this area. There additionally is narrowing of the proximal left M2 segment. 4.  Asymmetry in appearance of the vallecula on the left as compared to the right of questionable significance. 5.  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  White matter changes involving the cerebral hemispheres which could reflect more chronic small vessel ischemic change. It looks like there is old lacunar type infarction right basal ganglia. 7.  There are densities noted within the scalp in the left frontal area and in the preseptal left periorbital region that could  reflect small foreign bodies and may relate to old trauma.  This report was finalized on 3/8/2022 12:52 PM by Kevin Olson MD.      XR Chest 1 View    Result Date: 3/8/2022  1.  There is some cardiomegaly. 2.  Suggested hiatal hernia.  This report was finalized on 3/8/2022 12:59 PM by Kevin Olson MD.      CT Head Without Contrast Stroke Protocol    Result Date: 3/8/2022  1.  No significant perfusion abnormality. 2.  Abnormal appearance to the right posterior cerebral artery that might be reflective of a vasculitis or fibromuscular dysplasia. 3.  Hard plaque in the area of the left M1 segment resulting in some narrowing within this area. There additionally is narrowing of the proximal left M2 segment. 4.  Asymmetry in appearance of the vallecula on the left as compared to the right of questionable significance. 5.  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  White matter changes involving the cerebral hemispheres which could reflect more chronic small vessel ischemic change. It looks like there is old lacunar type infarction right basal ganglia. 7.  There are densities noted within the scalp in the left frontal area and in the preseptal left periorbital region that could reflect small foreign bodies and may relate to old trauma.  This report was finalized on 3/8/2022 12:52 PM by Kevin Olson MD.      CT Angiogram Head w AI Analysis of LVO    Result Date: 3/8/2022  1.  No significant perfusion abnormality. 2.  Abnormal appearance to the right posterior cerebral artery that might be reflective of a vasculitis or fibromuscular dysplasia. 3.  Hard plaque in the area of the left M1 segment resulting in some narrowing within this area. There additionally is narrowing of the proximal left M2 segment. 4.  Asymmetry in appearance of the vallecula on the left as compared to the right of questionable significance. 5.  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  White matter changes involving the cerebral hemispheres which could reflect more  chronic small vessel ischemic change. It looks like there is old lacunar type infarction right basal ganglia. 7.  There are densities noted within the scalp in the left frontal area and in the preseptal left periorbital region that could reflect small foreign bodies and may relate to old trauma.  This report was finalized on 3/8/2022 12:52 PM by Kevin Olson MD.      CT CEREBRAL PERFUSION WITH & WITHOUT CONTRAST    Result Date: 3/8/2022  1.  No significant perfusion abnormality. 2.  Abnormal appearance to the right posterior cerebral artery that might be reflective of a vasculitis or fibromuscular dysplasia. 3.  Hard plaque in the area of the left M1 segment resulting in some narrowing within this area. There additionally is narrowing of the proximal left M2 segment. 4.  Asymmetry in appearance of the vallecula on the left as compared to the right of questionable significance. 5.  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  White matter changes involving the cerebral hemispheres which could reflect more chronic small vessel ischemic change. It looks like there is old lacunar type infarction right basal ganglia. 7.  There are densities noted within the scalp in the left frontal area and in the preseptal left periorbital region that could reflect small foreign bodies and may relate to old trauma.  This report was finalized on 3/8/2022 12:52 PM by Kevin Olson MD.                Assessment/Plan:   This is a 72-year-old right-handed  female with a past medical history of diabetes mellitus type 2 and hypertension.  She presents to [facility] ED via EMS for further evaluation of right-sided weakness, right facial droop, and slurred speech.  Her LKW was yesterday at 11:30 PM.  She is not eligible for IV thrombolytic therapy due to LKW being greater than 4.5 hours.  Her noncontrast head CT, CTA head and neck, and CT cerebral perfusion revealed hard plaque in the left M1 segment with narrowing in the area.  In addition, there  was narrowing of the left M2 segment.  White matter change in the cerebral hemisphere reflecting more chronic small vessel ischemic changes.  An old lacunar infarct of the right basal ganglia was noted.  No large vessel occlusion or perfusion abnormalities were seen.  The patient will be admitted and will receive further stroke work-up.      1. Suspected transient ischemic attack/acute ischemic stroke, left hemisphere   -Differentials include: TIA, acute ischemic stroke of the left basal ganglia or left internal capsule or left MCA, iCAD, atherosclerotic disease   -Initiated TIA/acute ischemic stroke order set   -MRI brain without contrast   -TTE with bubble study   -ASA 81 mg daily   -CTA head and neck reveal revealed hard plaque in left M1 segment with narrowing in the area.  Patient narrowing of the left M2 segment.  Patient needs to be loaded with 300 mg of Plavix and then 75 mg of Plavix daily    -N.p.o. till bedside dysphagia screening has been completed with nursing staff or SLP   -Activity as tolerated   -PT/OT/SLP evaluation and treat   -NIHSS assessment/neurochecks  2. Hypertension   -Please allow for blood pressure autoregulation.  Keep SBP < 220   -Okay to give nicardipine gtt if SBP > 220   -Hold all antihypertensive home medications   -Cardiac monitoring  3. Hyperlipidemia   -Fasting lipid panel in the a.m.   -Atorvastatin 80 mg nightly  4. Diabetes mellitus type 2   -History of T2DM   -Hemoglobin A1c in the a.m.   -Euglycemic goal of < 140   -Inpatient diabetes educator consult if A1c is over 7.0   -Management per primary team    Plan of care discussed with Dr. Borrego, the patient, the patient's spouse, and the nursing staff.  Neurology stroke will follow.  If there are any questions or concerns please feel free to reach out.  Thank you for the consult.      VALENCIA Navas  March 8, 2022  12:57 EST

## 2022-03-09 ENCOUNTER — APPOINTMENT (OUTPATIENT)
Dept: CARDIOLOGY | Facility: HOSPITAL | Age: 72
End: 2022-03-09

## 2022-03-09 LAB
ALBUMIN SERPL-MCNC: 3.9 G/DL (ref 3.5–5.2)
ALBUMIN/GLOB SERPL: 1.2 G/DL
ALP SERPL-CCNC: 93 U/L (ref 39–117)
ALT SERPL W P-5'-P-CCNC: 11 U/L (ref 1–33)
ANION GAP SERPL CALCULATED.3IONS-SCNC: 13 MMOL/L (ref 5–15)
AST SERPL-CCNC: 12 U/L (ref 1–32)
BH CV ECHO MEAS - AO MAX PG (FULL): 6.1 MMHG
BH CV ECHO MEAS - AO MAX PG: 15 MMHG
BH CV ECHO MEAS - AO MEAN PG (FULL): 2 MMHG
BH CV ECHO MEAS - AO MEAN PG: 8 MMHG
BH CV ECHO MEAS - AO ROOT AREA (BSA CORRECTED): 1.5
BH CV ECHO MEAS - AO ROOT AREA: 5.7 CM^2
BH CV ECHO MEAS - AO ROOT DIAM: 2.7 CM
BH CV ECHO MEAS - AO V2 MAX: 194 CM/SEC
BH CV ECHO MEAS - AO V2 MEAN: 134 CM/SEC
BH CV ECHO MEAS - AO V2 VTI: 41.5 CM
BH CV ECHO MEAS - ASC AORTA: 3.1 CM
BH CV ECHO MEAS - AVA(I,A): 2.4 CM^2
BH CV ECHO MEAS - AVA(I,D): 2.4 CM^2
BH CV ECHO MEAS - AVA(V,A): 2.2 CM^2
BH CV ECHO MEAS - AVA(V,D): 2.2 CM^2
BH CV ECHO MEAS - BSA(HAYCOCK): 2 M^2
BH CV ECHO MEAS - BSA: 1.8 M^2
BH CV ECHO MEAS - BZI_BMI: 37.9 KILOGRAMS/M^2
BH CV ECHO MEAS - BZI_METRIC_HEIGHT: 152.4 CM
BH CV ECHO MEAS - BZI_METRIC_WEIGHT: 88 KG
BH CV ECHO MEAS - EDV(CUBED): 46.7 ML
BH CV ECHO MEAS - EDV(MOD-SP2): 33.5 ML
BH CV ECHO MEAS - EDV(MOD-SP4): 50.6 ML
BH CV ECHO MEAS - EDV(TEICH): 54.4 ML
BH CV ECHO MEAS - EF(CUBED): 66.5 %
BH CV ECHO MEAS - EF(MOD-BP): 66 %
BH CV ECHO MEAS - EF(MOD-SP2): 66.3 %
BH CV ECHO MEAS - EF(MOD-SP4): 70.6 %
BH CV ECHO MEAS - EF(TEICH): 59 %
BH CV ECHO MEAS - ESV(CUBED): 15.6 ML
BH CV ECHO MEAS - ESV(MOD-SP2): 11.3 ML
BH CV ECHO MEAS - ESV(MOD-SP4): 14.9 ML
BH CV ECHO MEAS - ESV(TEICH): 22.3 ML
BH CV ECHO MEAS - FS: 30.6 %
BH CV ECHO MEAS - IVS/LVPW: 1
BH CV ECHO MEAS - IVSD: 1.1 CM
BH CV ECHO MEAS - LA DIMENSION: 3.3 CM
BH CV ECHO MEAS - LA/AO: 1.2
BH CV ECHO MEAS - LAD MAJOR: 5.4 CM
BH CV ECHO MEAS - LAT PEAK E' VEL: 10.8 CM/SEC
BH CV ECHO MEAS - LATERAL E/E' RATIO: 8.6
BH CV ECHO MEAS - LV DIASTOLIC VOL/BSA (35-75): 27.5 ML/M^2
BH CV ECHO MEAS - LV IVRT: 0.1 SEC
BH CV ECHO MEAS - LV MASS(C)D: 124.1 GRAMS
BH CV ECHO MEAS - LV MASS(C)DI: 67.4 GRAMS/M^2
BH CV ECHO MEAS - LV MAX PG: 8.9 MMHG
BH CV ECHO MEAS - LV MEAN PG: 6 MMHG
BH CV ECHO MEAS - LV SYSTOLIC VOL/BSA (12-30): 8.1 ML/M^2
BH CV ECHO MEAS - LV V1 MAX: 149 CM/SEC
BH CV ECHO MEAS - LV V1 MEAN: 115 CM/SEC
BH CV ECHO MEAS - LV V1 VTI: 34.8 CM
BH CV ECHO MEAS - LVIDD: 3.6 CM
BH CV ECHO MEAS - LVIDS: 2.5 CM
BH CV ECHO MEAS - LVLD AP2: 5.6 CM
BH CV ECHO MEAS - LVLD AP4: 7.2 CM
BH CV ECHO MEAS - LVLS AP2: 4.6 CM
BH CV ECHO MEAS - LVLS AP4: 5.3 CM
BH CV ECHO MEAS - LVOT AREA (M): 2.8 CM^2
BH CV ECHO MEAS - LVOT AREA: 2.8 CM^2
BH CV ECHO MEAS - LVOT DIAM: 1.9 CM
BH CV ECHO MEAS - LVPWD: 1.1 CM
BH CV ECHO MEAS - MED PEAK E' VEL: 5.6 CM/SEC
BH CV ECHO MEAS - MEDIAL E/E' RATIO: 16.6
BH CV ECHO MEAS - MV A MAX VEL: 122 CM/SEC
BH CV ECHO MEAS - MV DEC SLOPE: 372 CM/SEC^2
BH CV ECHO MEAS - MV DEC TIME: 0.3 SEC
BH CV ECHO MEAS - MV E MAX VEL: 92.4 CM/SEC
BH CV ECHO MEAS - MV E/A: 0.76
BH CV ECHO MEAS - MV MAX PG: 6.9 MMHG
BH CV ECHO MEAS - MV MEAN PG: 3 MMHG
BH CV ECHO MEAS - MV P1/2T MAX VEL: 115 CM/SEC
BH CV ECHO MEAS - MV P1/2T: 90.5 MSEC
BH CV ECHO MEAS - MV V2 MAX: 131 CM/SEC
BH CV ECHO MEAS - MV V2 MEAN: 80.1 CM/SEC
BH CV ECHO MEAS - MV V2 VTI: 36.3 CM
BH CV ECHO MEAS - MVA P1/2T LCG: 1.9 CM^2
BH CV ECHO MEAS - MVA(P1/2T): 2.4 CM^2
BH CV ECHO MEAS - MVA(VTI): 2.7 CM^2
BH CV ECHO MEAS - PA ACC TIME: 0.07 SEC
BH CV ECHO MEAS - PA PR(ACCEL): 45.7 MMHG
BH CV ECHO MEAS - PI END-D VEL: 63.8 CM/SEC
BH CV ECHO MEAS - RAP SYSTOLE: 3 MMHG
BH CV ECHO MEAS - RVSP: 27 MMHG
BH CV ECHO MEAS - SI(AO): 129 ML/M^2
BH CV ECHO MEAS - SI(CUBED): 16.8 ML/M^2
BH CV ECHO MEAS - SI(LVOT): 53.5 ML/M^2
BH CV ECHO MEAS - SI(MOD-SP2): 12 ML/M^2
BH CV ECHO MEAS - SI(MOD-SP4): 19.4 ML/M^2
BH CV ECHO MEAS - SI(TEICH): 17.4 ML/M^2
BH CV ECHO MEAS - SV(AO): 237.6 ML
BH CV ECHO MEAS - SV(CUBED): 31 ML
BH CV ECHO MEAS - SV(LVOT): 98.7 ML
BH CV ECHO MEAS - SV(MOD-SP2): 22.2 ML
BH CV ECHO MEAS - SV(MOD-SP4): 35.7 ML
BH CV ECHO MEAS - SV(TEICH): 32.1 ML
BH CV ECHO MEAS - TAPSE (>1.6): 2.3 CM
BH CV ECHO MEAS - TR MAX PG: 24 MMHG
BH CV ECHO MEAS - TR MAX VEL: 246.7 CM/SEC
BH CV ECHO MEASUREMENTS AVERAGE E/E' RATIO: 11.27
BH CV VAS BP RIGHT ARM: NORMAL MMHG
BH CV XLRA - RV BASE: 3.7 CM
BH CV XLRA - RV LENGTH: 6.3 CM
BH CV XLRA - RV MID: 2.3 CM
BH CV XLRA - TDI S': 15.4 CM/SEC
BILIRUB SERPL-MCNC: 0.3 MG/DL (ref 0–1.2)
BUN SERPL-MCNC: 18 MG/DL (ref 8–23)
BUN/CREAT SERPL: 19.4 (ref 7–25)
CALCIUM SPEC-SCNC: 9.5 MG/DL (ref 8.6–10.5)
CHLORIDE SERPL-SCNC: 103 MMOL/L (ref 98–107)
CHOLEST SERPL-MCNC: 195 MG/DL (ref 0–200)
CO2 SERPL-SCNC: 25 MMOL/L (ref 22–29)
CREAT SERPL-MCNC: 0.93 MG/DL (ref 0.57–1)
DEPRECATED RDW RBC AUTO: 41.3 FL (ref 37–54)
EGFRCR SERPLBLD CKD-EPI 2021: 66.3 ML/MIN/1.73
ERYTHROCYTE [DISTWIDTH] IN BLOOD BY AUTOMATED COUNT: 12.8 % (ref 12.3–15.4)
GLOBULIN UR ELPH-MCNC: 3.2 GM/DL
GLUCOSE BLDC GLUCOMTR-MCNC: 156 MG/DL (ref 70–130)
GLUCOSE BLDC GLUCOMTR-MCNC: 163 MG/DL (ref 70–130)
GLUCOSE BLDC GLUCOMTR-MCNC: 221 MG/DL (ref 70–130)
GLUCOSE BLDC GLUCOMTR-MCNC: 271 MG/DL (ref 70–130)
GLUCOSE SERPL-MCNC: 145 MG/DL (ref 65–99)
HBA1C MFR BLD: 6.5 % (ref 4.8–5.6)
HCT VFR BLD AUTO: 36.6 % (ref 34–46.6)
HDLC SERPL-MCNC: 55 MG/DL (ref 40–60)
HGB BLD-MCNC: 12.3 G/DL (ref 12–15.9)
LDLC SERPL CALC-MCNC: 112 MG/DL (ref 0–100)
LDLC/HDLC SERPL: 1.96 {RATIO}
LEFT ATRIUM VOLUME INDEX: 32.3 ML/M^2
LEFT ATRIUM VOLUME: 59.5 ML
MCH RBC QN AUTO: 29.6 PG (ref 26.6–33)
MCHC RBC AUTO-ENTMCNC: 33.6 G/DL (ref 31.5–35.7)
MCV RBC AUTO: 88.2 FL (ref 79–97)
PLATELET # BLD AUTO: 370 10*3/MM3 (ref 140–450)
PMV BLD AUTO: 10.1 FL (ref 6–12)
POTASSIUM SERPL-SCNC: 4.1 MMOL/L (ref 3.5–5.2)
PROT SERPL-MCNC: 7.1 G/DL (ref 6–8.5)
RBC # BLD AUTO: 4.15 10*6/MM3 (ref 3.77–5.28)
SODIUM SERPL-SCNC: 141 MMOL/L (ref 136–145)
TRIGL SERPL-MCNC: 160 MG/DL (ref 0–150)
TSH SERPL DL<=0.05 MIU/L-ACNC: 1.85 UIU/ML (ref 0.27–4.2)
VLDLC SERPL-MCNC: 28 MG/DL (ref 5–40)
WBC NRBC COR # BLD: 9.65 10*3/MM3 (ref 3.4–10.8)

## 2022-03-09 PROCEDURE — 97110 THERAPEUTIC EXERCISES: CPT

## 2022-03-09 PROCEDURE — 84443 ASSAY THYROID STIM HORMONE: CPT | Performed by: HOSPITALIST

## 2022-03-09 PROCEDURE — 99232 SBSQ HOSP IP/OBS MODERATE 35: CPT | Performed by: HOSPITALIST

## 2022-03-09 PROCEDURE — 82962 GLUCOSE BLOOD TEST: CPT

## 2022-03-09 PROCEDURE — 97165 OT EVAL LOW COMPLEX 30 MIN: CPT

## 2022-03-09 PROCEDURE — 93306 TTE W/DOPPLER COMPLETE: CPT | Performed by: INTERNAL MEDICINE

## 2022-03-09 PROCEDURE — 80061 LIPID PANEL: CPT

## 2022-03-09 PROCEDURE — 85027 COMPLETE CBC AUTOMATED: CPT | Performed by: HOSPITALIST

## 2022-03-09 PROCEDURE — 80053 COMPREHEN METABOLIC PANEL: CPT | Performed by: HOSPITALIST

## 2022-03-09 PROCEDURE — 93306 TTE W/DOPPLER COMPLETE: CPT

## 2022-03-09 PROCEDURE — 99233 SBSQ HOSP IP/OBS HIGH 50: CPT | Performed by: STUDENT IN AN ORGANIZED HEALTH CARE EDUCATION/TRAINING PROGRAM

## 2022-03-09 PROCEDURE — G0108 DIAB MANAGE TRN  PER INDIV: HCPCS

## 2022-03-09 PROCEDURE — 97530 THERAPEUTIC ACTIVITIES: CPT

## 2022-03-09 PROCEDURE — 83036 HEMOGLOBIN GLYCOSYLATED A1C: CPT

## 2022-03-09 PROCEDURE — 97162 PT EVAL MOD COMPLEX 30 MIN: CPT

## 2022-03-09 RX ADMIN — ATORVASTATIN CALCIUM 80 MG: 40 TABLET, FILM COATED ORAL at 20:18

## 2022-03-09 RX ADMIN — CLOPIDOGREL BISULFATE 75 MG: 75 TABLET ORAL at 08:09

## 2022-03-09 RX ADMIN — Medication 10 ML: at 20:17

## 2022-03-09 RX ADMIN — Medication 10 ML: at 08:09

## 2022-03-09 RX ADMIN — HYDROCHLOROTHIAZIDE 12.5 MG: 12.5 CAPSULE ORAL at 08:09

## 2022-03-09 RX ADMIN — Medication 2000 UNITS: at 08:09

## 2022-03-09 RX ADMIN — PANTOPRAZOLE SODIUM 40 MG: 40 TABLET, DELAYED RELEASE ORAL at 07:51

## 2022-03-09 RX ADMIN — ASPIRIN 81 MG 81 MG: 81 TABLET ORAL at 08:09

## 2022-03-09 NOTE — PLAN OF CARE
Goal Outcome Evaluation:   VSS. Pt A/Ox4. NSR. Room air. Denies pain. NIH 5. Bedrest. Pt denies further needs at this time.

## 2022-03-09 NOTE — PLAN OF CARE
Goal Outcome Evaluation:  Plan of Care Reviewed With: patient, spouse           Outcome Evaluation: PT initial Eval completed.  Pt presents with R side weakness, R side coordination impairments, balance deficits, decreased functional endurance, and decreased indep with functional mobility.  Pt ambulated 10ft with modAx2 and BUE support.  Limited today d/t fatigue and weakness with intermittent RLE buckling with weightbearing activity.  Pt warrants skilled IP PT to improve indep with mobility and return to PLOF.  Rec IPR upon d/c.

## 2022-03-09 NOTE — CASE MANAGEMENT/SOCIAL WORK
Continued Stay Note   [facility]     Patient Name: Danna Dickinson  MRN: [id number]  Today's Date: 3/9/2022    Admit Date: 3/8/2022     Discharge Plan     Row Name 03/09/22 1101       Plan    Plan Inpatient Rehab    Plan Comments Spoke with patient in room.  Therapy recommending IRF at discharge.  Patient agreeable.  Referral called to Crissy with [facility].  CM will continue to follow.    Final Discharge Disposition Code 62 - inpatient rehab facility               Discharge Codes    No documentation.               Expected Discharge Date and Time     Expected Discharge Date Expected Discharge Time    Mar 11, 2022             Nora Holm RN

## 2022-03-09 NOTE — PROGRESS NOTES
Stroke Progress Note       Chief Complaint:  Right-sided weakness, right facial droop and slurred speech    Subjective    Subjective     Subjective:  No events overnight. Awake and sitting in chair.  is in the room. Continues to have slurred speech. She continues to have right sided weakness.    Review of Systems   Constitutional: Positive for fatigue.   HENT: Negative.    Eyes: Negative for visual disturbance.   Respiratory: Negative.    Cardiovascular: Negative.    Gastrointestinal: Negative.    Musculoskeletal: Negative.    Skin: Negative.    Neurological: Positive for facial asymmetry, speech difficulty and weakness.   Psychiatric/Behavioral: Negative.             Objective    Objective      Temp:  [97.7 °F (36.5 °C)-98.2 °F (36.8 °C)] 98.2 °F (36.8 °C)  Heart Rate:  [60-99] 64  Resp:  [16-18] 16  BP: (145-178)/(65-89) 149/79        Neurological Exam  Mental Status  Awake and alert. Oriented to person, place and time. Oriented to person, place, and time. Memory is normal. Recent and remote memory are intact. Moderate dysarthria present. Language is fluent with no aphasia. Attention and concentration are normal. Fund of knowledge is appropriate for level of education.    Cranial Nerves  CN II: Right visual acuity: normal. Left visual acuity: normal. Right normal visual field. Left normal visual field.  CN III, IV, VI: Extraocular movements intact bilaterally. Pupils equal round and reactive to light bilaterally.  CN V: Facial sensation is normal.  CN VII:  Right: There is central facial weakness.  CN VIII: Bilateral hearing appears to be intact.  CN IX, X: Palate elevates symmetrically  CN XI:  Right: Sternocleidomastoid strength is weak. Trapezius strength is weak.  CN XII: Tongue midline without atrophy or fasciculations.    Motor  Normal muscle bulk throughout. No fasciculations present. Normal muscle tone. No abnormal involuntary movements. Strength is 5/5 in all four extremities except as  noted.  Right sided upper and lower extremity weakness.    Sensory  Normal sensation.Light touch is normal in upper and lower extremities.     Coordination  Right: Finger-to-nose abnormality: Slow with finger to nose.  Missed the finger and she touched her upper lip. She is ataxic with her right upper extremity.    Gait   Abnormal gait.  Per PT, patient had difficulty standing on right lower extremity. She was able to shift from bed to chair on left lower extremity.      Physical Exam  Constitutional:       General: She is awake. She is not in acute distress.     Appearance: She is obese.   HENT:      Head: Normocephalic and atraumatic.      Mouth/Throat:      Mouth: Mucous membranes are moist.      Pharynx: Oropharynx is clear.   Eyes:      Extraocular Movements: Extraocular movements intact.      Pupils: Pupils are equal, round, and reactive to light.   Cardiovascular:      Rate and Rhythm: Normal rate and regular rhythm.   Pulmonary:      Effort: Pulmonary effort is normal. No respiratory distress.   Musculoskeletal:      Right lower leg: No edema.      Left lower leg: No edema.   Skin:     General: Skin is warm and dry.   Neurological:      Mental Status: She is alert and oriented to person, place, and time.      Cranial Nerves: Dysarthria and facial asymmetry present.      Sensory: Sensation is intact.      Motor: Weakness present.      Coordination: Finger-Nose-Finger Test abnormal.      Gait: Gait abnormal.   Psychiatric:         Attention and Perception: Attention normal.         Speech: Speech is delayed and slurred.         Behavior: Behavior is cooperative.         Cognition and Memory: Cognition and memory normal.         Results Review:    I reviewed the patient's new clinical results.     Glucose: 271  Hemoglobin A1c: 6.50    WBC   Date Value Ref Range Status   03/09/2022 9.65 3.40 - 10.80 10*3/mm3 Final     RBC   Date Value Ref Range Status   03/09/2022 4.15 3.77 - 5.28 10*6/mm3 Final     Hemoglobin    Date Value Ref Range Status   03/09/2022 12.3 12.0 - 15.9 g/dL Final     Hematocrit   Date Value Ref Range Status   03/09/2022 36.6 34.0 - 46.6 % Final     MCV   Date Value Ref Range Status   03/09/2022 88.2 79.0 - 97.0 fL Final     MCH   Date Value Ref Range Status   03/09/2022 29.6 26.6 - 33.0 pg Final     MCHC   Date Value Ref Range Status   03/09/2022 33.6 31.5 - 35.7 g/dL Final     RDW   Date Value Ref Range Status   03/09/2022 12.8 12.3 - 15.4 % Final     RDW-SD   Date Value Ref Range Status   03/09/2022 41.3 37.0 - 54.0 fl Final     MPV   Date Value Ref Range Status   03/09/2022 10.1 6.0 - 12.0 fL Final     Platelets   Date Value Ref Range Status   03/09/2022 370 140 - 450 10*3/mm3 Final     Neutrophil %   Date Value Ref Range Status   03/08/2022 76.6 (H) 42.7 - 76.0 % Final     Lymphocyte %   Date Value Ref Range Status   03/08/2022 15.9 (L) 19.6 - 45.3 % Final     Monocyte %   Date Value Ref Range Status   03/08/2022 6.0 5.0 - 12.0 % Final     Eosinophil %   Date Value Ref Range Status   03/08/2022 0.5 0.3 - 6.2 % Final     Basophil %   Date Value Ref Range Status   03/08/2022 0.5 0.0 - 1.5 % Final     Immature Grans %   Date Value Ref Range Status   03/08/2022 0.5 0.0 - 0.5 % Final     Neutrophils, Absolute   Date Value Ref Range Status   03/08/2022 7.93 (H) 1.70 - 7.00 10*3/mm3 Final     Lymphocytes, Absolute   Date Value Ref Range Status   03/08/2022 1.65 0.70 - 3.10 10*3/mm3 Final     Monocytes, Absolute   Date Value Ref Range Status   03/08/2022 0.62 0.10 - 0.90 10*3/mm3 Final     Eosinophils, Absolute   Date Value Ref Range Status   03/08/2022 0.05 0.00 - 0.40 10*3/mm3 Final     Basophils, Absolute   Date Value Ref Range Status   03/08/2022 0.05 0.00 - 0.20 10*3/mm3 Final     Immature Grans, Absolute   Date Value Ref Range Status   03/08/2022 0.05 0.00 - 0.05 10*3/mm3 Final     nRBC   Date Value Ref Range Status   03/08/2022 0.0 0.0 - 0.2 /100 WBC Final               Assessment/Plan      Assessment/Plan:  This is a 72-year-old right-handed  female with a past medical history of diabetes mellitus type 2 and hypertension.  She presents to [facility] ED via EMS for further evaluation of right-sided weakness, right facial droop, and slurred speech.  Her LKW was yesterday at 11:30 PM.  She is not eligible for IV thrombolytic therapy due to LKW being greater than 4.5 hours.  Her noncontrast head CT, CTA head and neck, and CT cerebral perfusion revealed hard plaque in the left M1 segment with narrowing in the area.  In addition, there was narrowing of the left M2 segment.  An old lacunar infarct of the right basal ganglia was noted.  No large vessel occlusion or perfusion abnormalities were seen.       1. Left posterior limb internal capsule, etiology likely, small vessel disease              -TTE with bubble study              -Start on DAPT (ASA 81 mg and Plavix 75 mg) continue for 21 days and then afterwards,  325 mg ASA (Full dose)              -Activity as tolerated               -Continue with PT/OT/SLP   -Fall precautions  2. Hypertension              -Please allow for normalized blood pressure. Goal of -160.    -Avoid hypotension              -Ok to start home antihypertensives               -Cardiac monitoring  3. Hyperlipidemia              -Awaiting Fasting lipid panel               -Atorvastatin 80 mg nightly  4. Diabetes mellitus type 2              -History of T2DM              -Hemoglobin A1c: 6.50, A1C is < 7 no need for diabetic education               -Euglycemic goal of < 140              -Management per primary team     Plan of care discussed with Dr. Hope, the patient, the patient's spouse, and the nursing staff.  Neurology stroke will follow.  If there are any questions or concerns please feel free to reach out.  Thank you for the consult.          VALENCIA Navas  03/09/22  09:30 EST

## 2022-03-09 NOTE — PLAN OF CARE
Problem: Adult Inpatient Plan of Care  Goal: Plan of Care Review  Recent Flowsheet Documentation  Taken 3/9/2022 0843 by Eric Louis, OT  Progress: (OT eval) no change  Plan of Care Reviewed With:   patient   spouse  Outcome Evaluation: OT evaluation completed. Baseline ADL completion limited by R-sided weakness and impaired coordination and balance warranting skilled IP OT services to promote return to PLOF. Mod/Max assist required for LB ADLs and fxl mobility limited by RLE buckling. Pt/Spouse educated on foam block and self-AAROM HEP for vickie JIMENEZ'zac understanding, encouraged to perform outside of therapy. Recommend d/c to IP rehab.

## 2022-03-09 NOTE — CASE MANAGEMENT/SOCIAL WORK
Discharge Planning Assessment  [facility]     Patient Name: Danna Dickinson  MRN: [id number]  Today's Date: 3/8/2022    Admit Date: 3/8/2022     Discharge Needs Assessment     Row Name 03/08/22 4037       Living Environment    People in Home spouse    Name(s) of People in Home LOUIE DICKNISON Spouse   [phone]    Current Living Arrangements home    Primary Care Provided by self    Provides Primary Care For no one    Family Caregiver if Needed spouse    Family Caregiver Names LOUIE DICKINSON Spouse   [phone]    Quality of Family Relationships helpful;involved;supportive    Able to Return to Prior Arrangements yes       Resource/Environmental Concerns    Resource/Environmental Concerns none       Transition Planning    Patient/Family Anticipates Transition to home with family    Patient/Family Anticipated Services at Transition ;rehabilitation services    Transportation Anticipated family or friend will provide       Discharge Needs Assessment    Readmission Within the Last 30 Days no previous admission in last 30 days    Equipment Currently Used at Home none    Concerns to be Addressed discharge planning;adjustment to diagnosis/illness    Anticipated Changes Related to Illness none    Equipment Needed After Discharge none               Discharge Plan     Row Name 03/08/22 6562       Plan    Plan Initial    Plan Comments CM spoke with patient at bedside. Patient resides in [facility] with her spouse. Patient is independent with ADL’s, denies any DME. Patient denies any current home health or outpatient services. Patient has medical insurance, prescription coverage and is able to afford/obtain medications without difficulty. Patient denies any discharge planning needs. Goal is home with spouse. CM will continue to follow.    Final Discharge Disposition Code 30 - still a patient              Continued Care and Services - Admitted Since 3/8/2022    Coordination has not been started for this encounter.           Demographic Summary     Row Name 03/08/22 1830       General Information    Arrived From home    Referral Source emergency department    Reason for Consult discharge planning    Preferred Language English       Contact Information    Contact Information Comments LOUIE CHATMAN Spouse   [phone]               Functional Status     Row Name 03/08/22 1844       Functional Status    Usual Activity Tolerance good    Current Activity Tolerance moderate       Functional Status, IADL    Medications independent    Meal Preparation independent    Housekeeping independent    Laundry independent    Shopping independent       Mental Status    General Appearance WDL WDL       Mental Status Summary    Recent Changes in Mental Status/Cognitive Functioning no changes       Employment/    Employment Status retired               Psychosocial    No documentation.                Abuse/Neglect    No documentation.                Legal    No documentation.                Substance Abuse    No documentation.                Patient Forms    No documentation.                   Jessica Burk RN

## 2022-03-09 NOTE — THERAPY EVALUATION
Patient Name: Danna Dickinson  : 1950    MRN: [id number]                              Today's Date: 3/9/2022       Admit Date: 3/8/2022    Visit Dx:     ICD-10-CM ICD-9-CM   1. Acute right-sided weakness  R53.1 728.87     Patient Active Problem List   Diagnosis   • Acute right-sided weakness   • DM2 (diabetes mellitus, type 2) (HCC)   • Essential hypertension     Past Medical History:   Diagnosis Date   • Diabetes mellitus (HCC)    • Hypertension      Past Surgical History:   Procedure Laterality Date   • APPENDECTOMY     • HYSTERECTOMY     • TONSILLECTOMY        General Information     Row Name 22 1204          Physical Therapy Time and Intention    Document Type evaluation  -BA     Mode of Treatment physical therapy  -BA     Row Name 22 1204          General Information    Patient Profile Reviewed yes  -BA     Prior Level of Function independent:;all household mobility;community mobility;gait;transfer;bed mobility;ADL's;using stairs;driving  Pt reported she did not use an AD for ambulation.  Reported no hx of falls.  -BA     Existing Precautions/Restrictions fall;other (see comments)  R-sided weakness with potential RLE buckle, limited R shoulder range (previous injury/pain), L hand dominant  -BA     Barriers to Rehab medically complex  -BA     Row Name 22 1204          Living Environment    People in Home spouse  -BA     Row Name 22 1204          Home Main Entrance    Number of Stairs, Main Entrance one  -BA     Stair Railings, Main Entrance none  -BA     Row Name 22 1204          Stairs Within Home, Primary    Stairs, Within Home, Primary flight to basement; pt reported she doesn't have to use.  Bedroom/bathroom located on main level.  -BA     Row Name 22 1204          Cognition    Orientation Status (Cognition) oriented x 4  -BA     Row Name 22 1204          Safety Issues, Functional Mobility    Safety Issues Affecting Function (Mobility) insight into  deficits/self-awareness;safety precaution awareness;safety precautions follow-through/compliance;sequencing abilities  -     Impairments Affecting Function (Mobility) balance;coordination;endurance/activity tolerance;motor control;postural/trunk control;range of motion (ROM);strength  -           User Key  (r) = Recorded By, (t) = Taken By, (c) = Cosigned By    Initials Name Provider Type     Tamia Gandhi, MAN Physical Therapist               Mobility     Row Name 03/09/22 1208          Bed Mobility    Bed Mobility supine-sit;scooting/bridging  -     Scooting/Bridging [place] (Bed Mobility) contact guard;verbal cues;nonverbal cues (demo/gesture)  -     Supine-Sit [place] (Bed Mobility) minimum assist (75% patient effort);1 person assist;verbal cues;nonverbal cues (demo/gesture)  -     Assistive Device (Bed Mobility) bed rails;head of bed elevated  -     Comment, (Bed Mobility) Increased effort with VCs/TCs for sequencing and hand placement.  Reported no dizziness upon sitting EOB.  -     Row Name 03/09/22 1208          Bed-Chair Transfer    Bed-Chair [place] (Transfers) moderate assist (50% patient effort);2 person assist;verbal cues  -     Assistive Device (Bed-Chair Transfers) other (see comments)  BUE support  -     Comment, (Bed-Chair Transfer) Transfer with taking a few lateral steps toward L side.  VCs/TCs for sequencing, hand placement, and weight shifting d/t difficulty advancing BLEs.  Noted some RLE buckling, but pt able to correct with minimal assistance with no full collapse.  -     Row Name 03/09/22 1208          Sit-Stand Transfer    Sit-Stand [place] (Transfers) minimum assist (75% patient effort);2 person assist;verbal cues  -     Assistive Device (Sit-Stand Transfers) other (see comments)  BUE support  -     Comment, (Sit-Stand Transfer) STS x 2 reps with R knee blocking.  VCs/TCs for sequencing and hand placement.  -     Row Name 03/09/22 1205           Gait/Stairs (Locomotion)    [place] Level (Gait) moderate assist (50% patient effort);2 person assist;verbal cues;other (see comments)  with chair follow for safety  -     Assistive Device (Gait) other (see comments)  BUE support  -     Distance in Feet (Gait) 10  -BA     Deviations/Abnormal Patterns (Gait) bilateral deviations;ataxic;base of support, narrow;[place] decreased;gait speed decreased;stride length decreased;weight shifting decreased  -     Bilateral Gait Deviations heel strike decreased;forward flexed posture  -     Right Sided Gait Deviations knee buckling, right side;weight shift ability decreased  -     Comment, (Gait/Stairs) Pt demonstrated step-to gait pattern with R leg lead.  Appears as if RLE may buckle d/t unsteadiness.  Difficulty with coordination/control RLE and tends to want to adduct inwards when taking a step causing narrow TANI.  Able to correct with increased focus and VCs/TCs.  VCs/TCs for weight shifting, widen TANI, increased control of R foot placement, and improved stride length.  Gait distance limited by fatigue.  -           User Key  (r) = Recorded By, (t) = Taken By, (c) = Cosigned By    Initials Name Provider Type     Tamia Gandhi, PT Physical Therapist               Obj/Interventions     Row Name 03/09/22 1221          Range of Motion Comprehensive    General Range of Motion bilateral lower extremity ROM WFL  -     Comment, General Range of Motion AROM LLE WFL.  Decreased AROM RLE with AAROM/PROM WFL.  -     Row Name 03/09/22 1221          Strength Comprehensive (MMT)    General Manual Muscle Testing (MMT) Assessment lower extremity strength deficits identified  -     Comment, General Manual Muscle Testing (MMT) Assessment LLE grossly 4/5 and RLE grossly 3+/5.  -     Row Name 03/09/22 1221          Motor Skills    Motor Skills coordination  -     Coordination gross motor deficit;right;lower extremity;moderate impairment;heel to shin  -      Row Name 03/09/22 1221          Balance    Balance Assessment sitting static balance;sitting dynamic balance;sit to stand dynamic balance;standing static balance;standing dynamic balance  -     Static Sitting Balance supervision  -     Dynamic Sitting Balance contact guard  -BA     Position, Sitting Balance unsupported;sitting edge of bed  -     Sit to Stand Dynamic Balance minimal assist;2-person assist  -BA     Static Standing Balance minimal assist;2-person assist  -BA     Dynamic Standing Balance moderate assist;2-person assist  -BA     Position/Device Used, Standing Balance supported;other (see comments)  BUE support  -     Balance Interventions sit to stand;standing;supported;static;dynamic;occupation based/functional task  -     Comment, Balance Mild/mod instability with standing and ambulation activities d/t RLE weakness and intermittent RLE buckling.  -     Row Name 03/09/22 1221          Sensory Assessment (Somatosensory)    Sensory Assessment (Somatosensory) LE sensation intact  -           User Key  (r) = Recorded By, (t) = Taken By, (c) = Cosigned By    Initials Name Provider Type     Tamia Gandhi, PT Physical Therapist               Goals/Plan     Row Name 03/09/22 1303          Bed Mobility Goal 1 (PT)    Activity/Assistive Device (Bed Mobility Goal 1, PT) sit to supine/supine to sit  -     [place] Level/Cues Needed (Bed Mobility Goal 1, PT) modified independence  -     Time Frame (Bed Mobility Goal 1, PT) long term goal (LTG);2 weeks  -     Row Name 03/09/22 1303          Transfer Goal 1 (PT)    Activity/Assistive Device (Transfer Goal 1, PT) sit-to-stand/stand-to-sit;bed-to-chair/chair-to-bed;other (see comments);walker, rolling;walker, francois  type of AD dependent upon pt progress  -     [place] Level/Cues Needed (Transfer Goal 1, PT) contact guard required  -     Time Frame (Transfer Goal 1, PT) long term goal (LTG);2 weeks  -     Row Name 03/09/22  1303          Gait Training Goal 1 (PT)    Activity/Assistive Device (Gait Training Goal 1, PT) gait (walking locomotion);assistive device use;walker, rolling;walker, francois;other (see comments)  type of AD dependent upon pt progress  -BA     [place] Level (Gait Training Goal 1, PT) minimum assist (75% or more patient effort)  -BA     Distance (Gait Training Goal 1, PT) 150  -BA     Time Frame (Gait Training Goal 1, PT) long term goal (LTG);2 weeks  -BA     Row Name 03/09/22 1303          Stairs Goal 1 (PT)    Activity/Assistive Device (Stairs Goal 1, PT) ascending stairs;descending stairs  -BA     [place] Level/Cues Needed (Stairs Goal 1, PT) moderate assist (50-74% patient effort)  -BA     Number of Stairs (Stairs Goal 1, PT) 1  -BA     Time Frame (Stairs Goal 1, PT) long term goal (LTG);2 weeks  -           User Key  (r) = Recorded By, (t) = Taken By, (c) = Cosigned By    Initials Name Provider Type     Tamia Gandhi, PT Physical Therapist               Clinical Impression     Row Name 03/09/22 1223          Pain    Pretreatment Pain Rating 1/10  -     Posttreatment Pain Rating 1/10  -     Pain Location - Side/Orientation Right  -     Pain Location - shoulder  -     Pre/Posttreatment Pain Comment Reported minimal R shoulder pain from past injury.  Tolerated activity; RN aware and managing.  -     Pain Intervention(s) Ambulation/increased activity;Repositioned  -     Row Name 03/09/22 9853          Plan of Care Review    Plan of Care Reviewed With patient;spouse  -     Outcome Evaluation PT initial Eval completed.  Pt presents with R side weakness, R side coordination impairments, balance deficits, decreased functional endurance, and decreased indep with functional mobility.  Pt ambulated 10ft with modAx2 and BUE support.  Limited today d/t fatigue and weakness with intermittent RLE buckling with weightbearing activity.  Pt warrants skilled IP PT to improve indep with mobility and  return to [facility].  Rec IPR upon d/c.  -     Row Name 03/09/22 1226          Therapy Assessment/Plan (PT)    Rehab Potential (PT) good, to achieve stated therapy goals  -     Criteria for Skilled Interventions Met (PT) yes;meets criteria;skilled treatment is necessary  -     Row Name 03/09/22 1226          Vital Signs    Pre Systolic BP Rehab 149  -BA     Pre Treatment Diastolic BP 79  -BA     Post Systolic BP Rehab 165  -BA     Post Treatment Diastolic BP 85  -BA     Pretreatment Heart Rate (beats/min) 77  -BA     Posttreatment Heart Rate (beats/min) 75  -BA     Pre SpO2 (%) 95  -BA     O2 Delivery Pre Treatment room air  -BA     O2 Delivery Intra Treatment room air  -BA     Post SpO2 (%) 96  -BA     O2 Delivery Post Treatment room air  -BA     Pre Patient Position Supine  -BA     Intra Patient Position Standing  -BA     Post Patient Position Sitting  -     Row Name 03/09/22 1226          Positioning and Restraints    Pre-Treatment Position in bed  -BA     Post Treatment Position chair  -BA     In Chair notified nsg;reclined;sitting;call light within reach;encouraged to call for assist;exit alarm on;with family/caregiver;RUE elevated;LUE elevated;waffle cushion;on mechanical lift sling;legs elevated  -           User Key  (r) = Recorded By, (t) = Taken By, (c) = Cosigned By    Initials Name Provider Type     Tamia Gandhi, PT Physical Therapist               Outcome Measures     Row Name 03/09/22 1308 03/09/22 0800       How much help from another person do you currently need...    Turning from your back to your side while in flat bed without using bedrails? 3  -BA 4  -LL    Moving from lying on back to sitting on the side of a flat bed without bedrails? 3  -BA 3  -LL    Moving to and from a bed to a chair (including a wheelchair)? 2  -BA 3  -LL    Standing up from a chair using your arms (e.g., wheelchair, bedside chair)? 3  -BA 3  -LL    Climbing 3-5 steps with a railing? 2  -BA 2  -LL    To walk in  hospital room? 2  -BA 2  -LL    AM-PAC 6 Clicks Score (PT) 15  -BA 17  -LL    Row Name 03/09/22 1308 03/09/22 0853       Modified [place] Scale    Pre-Stroke Modified [place] Scale 6 - Unable to determine (UTD) from the medical record documentation  -BA --    Modified [place] Scale 4 - Moderately severe disability.  Unable to walk without assistance, and unable to attend to own bodily needs without assistance.  -BA 4 - Moderately severe disability.  Unable to walk without assistance, and unable to attend to own bodily needs without assistance.  -CS    Row Name 03/09/22 1308 03/09/22 0853       Functional Assessment    Outcome Measure Options AM-PAC 6 Clicks Basic Mobility (PT);Modified [place]  -BA AM-PAC 6 Clicks Daily Activity (OT);Modified [place]  -CS          User Key  (r) = Recorded By, (t) = Taken By, (c) = Cosigned By    Initials Name Provider Type    CS Eric Louis, OT Occupational Therapist    Tamia Castorena, PT Physical Therapist    Lizzie Ace, RN Registered Nurse                             Physical Therapy Education                 Title: PT OT SLP Therapies (In Progress)     Topic: Physical Therapy (In Progress)     Point: Mobility training (Done)     Learning Progress Summary           Patient Acceptance, E, VU,NR by  at 3/9/2022 1309                   Point: Home exercise program (Not Started)     Learner Progress:  Not documented in this visit.          Point: Body mechanics (Done)     Learning Progress Summary           Patient Acceptance, E, VU,NR by  at 3/9/2022 1309                   Point: Precautions (Done)     Learning Progress Summary           Patient Acceptance, E, VU,NR by  at 3/9/2022 1309                               User Key     Initials Effective Dates Name Provider Type [facility] 09/21/21 -  Tamia Gandhi, PT Physical Therapist PT              PT Recommendation and Plan  Planned Therapy Interventions (PT): balance training, bed mobility training, gait  training, home exercise program, motor coordination training, neuromuscular re-education, patient/family education, postural re-education, ROM (range of motion), stair training, strengthening, transfer training  Plan of Care Reviewed With: patient, spouse  Outcome Evaluation: PT initial Eval completed.  Pt presents with R side weakness, R side coordination impairments, balance deficits, decreased functional endurance, and decreased indep with functional mobility.  Pt ambulated 10ft with modAx2 and BUE support.  Limited today d/t fatigue and weakness with intermittent RLE buckling with weightbearing activity.  Pt warrants skilled IP PT to improve indep with mobility and return to [facility].  Rec IPR upon d/c.     Time Calculation:    PT Charges     Row Name 03/09/22 1310             Time Calculation    Start Time 1126  -BA      PT Received On 03/09/22  -BA      PT Goal Re-Cert Due Date 03/19/22  -BA              Time Calculation- PT    Total Timed Code Minutes- PT 11 minute(s)  -BA              Timed Charges    46885 - PT Therapeutic Activity Minutes 11  -BA              Untimed Charges    PT Eval/Re-eval Minutes 51  -BA              Total Minutes    Timed Charges Total Minutes 11  -BA      Untimed Charges Total Minutes 51  -BA       Total Minutes 62  -BA            User Key  (r) = Recorded By, (t) = Taken By, (c) = Cosigned By    Initials Name Provider Type    Tamia Castorena, PT Physical Therapist              Therapy Charges for Today     Code Description Service Date Service Provider Modifiers Qty    11432348240 HC PT THERAPEUTIC ACT EA 15 MIN 3/9/2022 Tamia Gandhi, PT GP 1    11793601022 HC PT EVAL MOD COMPLEXITY 4 3/9/2022 Tamia Gandhi, PT GP 1    88263272210 HC PT THER SUPP EA 15 MIN 3/9/2022 Tamia Gandhi, PT GP 2          PT G-Codes  Outcome Measure Options: AM-PAC 6 Clicks Basic Mobility (PT), Modified [place]  AM-PAC 6 Clicks Score (PT): 15  AM-PAC 6 Clicks Score (OT): 15  Modified [place]  Scale: 4 - Moderately severe disability.  Unable to walk without assistance, and unable to attend to own bodily needs without assistance.    Tamia Gandhi, PT  3/9/2022     Yes

## 2022-03-09 NOTE — CONSULTS
" Discussed and taught patient about type 2 diabetes self-management, risk factors, and importance of blood glucose control to reduce complications. Target blood glucose readings and A1c goals per ADA were reviewed. Reviewed with patient current A1c 6.5 and discussed its significance. Signs, symptoms, and treatment of hyperglycemia and hypoglycemia were discussed. Lifestyle changes such as physical activity with MD approval and healthy eating were encouraged. Stressed the importance of strict blood sugar control after surgery to prevent complications such as infection and to promote healing of incision. Encouraged pt to monitor blood sugar at home 1+  times per day and to call PCP if blood sugar is trending [place]. Encouraged to keep record of blood glucose readings to take to follow up appointment with PCP. Not a candidate for the outpatient stroke/ diabetes class as \" No large vessel occlusion or perfusion abnormalities were seen.\" per most recent notes. Thank you for this referral  "

## 2022-03-09 NOTE — PROGRESS NOTES
[facility] Medicine Services  PROGRESS NOTE    Patient Name: Danna Dickinson  : 1950  MRN: [id number]    Date of Admission: 3/8/2022  Primary Care Physician: Alvarado Ferrell MD    Subjective   Subjective     CC:  Right sided weakness, right facial droop, and slurred speech    HPI:  Patient sitting up in bedside chair with  at bedside. Speech seems more slurred this am but patient did not sleep and is very tired. Right upper/lower extremity strength is improving.    ROS:  Gen- No fevers, chills  CV- No chest pain, palpitations  Resp- No cough, dyspnea  GI- No N/V/D, abd pain  Neuro- right facial droop, right sided weakness, slurred speech    Objective   Objective     Vital Signs:   Temp:  [97.7 °F (36.5 °C)-98.2 °F (36.8 °C)] 98.2 °F (36.8 °C)  Heart Rate:  [60-99] 64  Resp:  [16-18] 16  BP: (145-178)/(65-89) 149/79     Physical Exam:  Constitutional: Awake, alert  Eyes: PERRLA, sclerae anicteric, no conjunctival injection, right facial droop  HENT: NCAT, mucous membranes moist  Neck: Supple, no thyromegaly, no lymphadenopathy, trachea midline  Respiratory: Clear to auscultation bilaterally, nonlabored respirations   Cardiovascular: RRR, no murmurs, rubs, or gallops, palpable pedal pulses bilaterally  Gastrointestinal: Positive bowel sounds, soft, nontender, nondistended  Musculoskeletal: No bilateral ankle edema, no clubbing or cyanosis to extremities  Psychiatric: Appropriate affect, cooperative  Neurologic: Oriented x 3, strength 3/5 in RUE and RLE, normal strength in LUE and LLE, right facial droop present, dysarthric speech- no aphasia, right finger to nose abnormal  Skin: No rashes    Results Reviewed:  LAB RESULTS:      Lab 22  0528 22  1316 22  1208 22  1206   WBC 9.65 10.35  --   --    HEMOGLOBIN 12.3 12.9  --   --    HEMOGLOBIN, POC  --   --   --  13.3   HEMATOCRIT 36.6 36.9  --   --    HEMATOCRIT POC  --   --   --  39   PLATELETS 370 356  --   --     NEUTROS ABS  --  7.93*  --   --    IMMATURE GRANS (ABS)  --  0.05  --   --    LYMPHS ABS  --  1.65  --   --    MONOS ABS  --  0.62  --   --    EOS ABS  --  0.05  --   --    MCV 88.2 86.4  --   --    APTT  --   --  24.5  --          Lab 03/09/22  0528 03/08/22  1205   CREATININE  --  1.00   HEMOGLOBIN A1C 6.50*  --          Lab 03/08/22  1316   ALT (SGPT) 10   AST (SGOT) 10         Lab 03/08/22  1316   TROPONIN T <0.010                 Lab 03/08/22  1206   PH, ARTERIAL 7.47     Brief Urine Lab Results     None          Microbiology Results Abnormal     None          CT Angiogram Neck    Result Date: 3/8/2022  DATE OF EXAM: 3/8/2022 12:07 PM  PROCEDURE: CT CEREBRAL PERFUSION W WO CONTRAST-, CT ANGIOGRAM NECK-, CT ANGIOGRAM HEAD W AI ANALYSIS OF LVO-, CT HEAD WO CONTRAST STROKE PROTOCOL-  INDICATIONS: Neuro deficit, acute, stroke suspected  COMPARISON: No comparisons available.  TECHNIQUE: Routine transaxial cuts were obtained through the head without administration of contrast. Routine transaxial cuts were then obtained through the head following the intravenous administration of 125 mL of Isovue 300. Core blood volume, core blood flow, mean transit time, and Tmax images were obtained utilizing the Rapid software protocol. A limited CT angiogram of the head was also performed to measure the blood vessel density. Additional postcontrast images were obtained through the head and neck.3-D volume rendered reconstructed images were created and reviewed along with the source images  The radiation dose reduction device was turned on for each scan per the ALARA (As Low as Reasonably Achievable) protocol.  FINDINGS: CT head: There are suggested white matter changes involving the cerebral hemispheres that could relate to more chronic small vessel ischemic change. Looks like there may be an old lacunar type infarct in the right basal ganglia. There some hard plaque noted in the area of the left M1 segment. Perfusion:  CBF  less than 30% 0 cc Tmax greater than 6 seconds: 0 cc Mismatch volume 0 cc  CTA head and neck: There is some hard plaque in the area of the carotid bulbs. There does not appear to be a significant stenosis by NASCET criteria. The vertebral arteries seem codominant. On evaluation of the intracranial vasculature the basilar artery is grossly unremarkable. There is somewhat of a beading appearance to the right posterior cerebral artery. The findings do result in some narrowing of the vessel particularly P2 segment. This finding is nonspecific. This could be seen with a vasculitis or fibromuscular dysplasia. The left posterior cerebral artery seems patent without a similar appearance. The anterior cerebral arteries and right middle cerebral artery are grossly unremarkable. There is hard plaque seen involving the M1 segment on the left. This does result in at least some underlying narrowing in this area. It looks like there additionally some narrowing of the more distal left middle cerebral artery around the M2 area.  Nonvascular findings: There are radiopaque densities in the preseptal left periorbital area and along the scalp in the left frontal region. There is asymmetry to the appearance of the vallecula which is less aerated as compared to the right side of uncertain significance. It looks like there is an old alyse 's fracture involving the spinous process of C7.      Impression: 1.  No significant perfusion abnormality. 2.  Abnormal appearance to the right posterior cerebral artery that might be reflective of a vasculitis or fibromuscular dysplasia. 3.  Hard plaque in the area of the left M1 segment resulting in some narrowing within this area. There additionally is narrowing of the proximal left M2 segment. 4.  Asymmetry in appearance of the vallecula on the left as compared to the right of questionable significance. 5.  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  White matter changes involving the cerebral  hemispheres which could reflect more chronic small vessel ischemic change. It looks like there is old lacunar type infarction right basal ganglia. 7.  There are densities noted within the scalp in the left frontal area and in the preseptal left periorbital region that could reflect small foreign bodies and may relate to old trauma.  This report was finalized on 3/8/2022 12:52 PM by Kevin Olson MD.      MRI Brain Without Contrast    Result Date: 3/8/2022  DATE OF EXAM: 3/8/2022 4:00 PM  PROCEDURE: MRI BRAIN WO CONTRAST-  INDICATIONS: Stroke, follow up; R53.1-Weakness  COMPARISON: CT head earlier the same day  TECHNIQUE: Multiplanar multisequence images of the brain were performed without contrast according to routine brain MRI protocol.  FINDINGS: There is a tiny area of diffusion restriction along the left posterior limb internal capsule concerning for small acute lacunar infarct. There is no evidence of associated hemorrhage or significant mass effect. Midline structures are unremarkable and the craniocervical junction is satisfactory in appearance. Fairly advanced age-related changes are otherwise noted with moderate generalized volume loss in addition to T2 hyperintense periventricular white matter changes of chronic small vessel ischemia. There is otherwise no evidence of intracranial hemorrhage, mass or mass effect. There is ex vacuo prominence of the lateral ventricles related to surrounding volume loss. The orbits are unremarkable and the paranasal sinuses are grossly clear.      Impression: Small acute lacunar infarct of the left posterior limb internal capsule. No additional territorial ischemia. No associated hemorrhage or mass effect.  Relatively advanced age-related changes otherwise present as above.  This report was finalized on 3/8/2022 4:26 PM by Wilfrid Da Silva.      XR Chest 1 View    Result Date: 3/8/2022  DATE OF EXAM: 3/8/2022 12:47 PM  PROCEDURE: XR CHEST 1 VW-  INDICATIONS: Acute Stroke  Protocol (onset < 12 hrs)  COMPARISON: No comparisons available.  TECHNIQUE: Single radiographic AP view of the chest was obtained.  FINDINGS: The heart is not definitely enlarged. It looks like there is a hiatal hernia. There are no infiltrates or obvious pleural effusions. The visualized osseous structures do not appear unusual.      Impression: 1.  There is some cardiomegaly. 2.  Suggested hiatal hernia.  This report was finalized on 3/8/2022 12:59 PM by Kevin Olson MD.      CT Head Without Contrast Stroke Protocol    Result Date: 3/8/2022  DATE OF EXAM: 3/8/2022 12:07 PM  PROCEDURE: CT CEREBRAL PERFUSION W WO CONTRAST-, CT ANGIOGRAM NECK-, CT ANGIOGRAM HEAD W AI ANALYSIS OF LVO-, CT HEAD WO CONTRAST STROKE PROTOCOL-  INDICATIONS: Neuro deficit, acute, stroke suspected  COMPARISON: No comparisons available.  TECHNIQUE: Routine transaxial cuts were obtained through the head without administration of contrast. Routine transaxial cuts were then obtained through the head following the intravenous administration of 125 mL of Isovue 300. Core blood volume, core blood flow, mean transit time, and Tmax images were obtained utilizing the Rapid software protocol. A limited CT angiogram of the head was also performed to measure the blood vessel density. Additional postcontrast images were obtained through the head and neck.3-D volume rendered reconstructed images were created and reviewed along with the source images  The radiation dose reduction device was turned on for each scan per the ALARA (As Low as Reasonably Achievable) protocol.  FINDINGS: CT head: There are suggested white matter changes involving the cerebral hemispheres that could relate to more chronic small vessel ischemic change. Looks like there may be an old lacunar type infarct in the right basal ganglia. There some hard plaque noted in the area of the left M1 segment. Perfusion:  CBF less than 30% 0 cc Tmax greater than 6 seconds: 0 cc Mismatch volume 0  cc  CTA head and neck: There is some hard plaque in the area of the carotid bulbs. There does not appear to be a significant stenosis by NASCET criteria. The vertebral arteries seem codominant. On evaluation of the intracranial vasculature the basilar artery is grossly unremarkable. There is somewhat of a beading appearance to the right posterior cerebral artery. The findings do result in some narrowing of the vessel particularly P2 segment. This finding is nonspecific. This could be seen with a vasculitis or fibromuscular dysplasia. The left posterior cerebral artery seems patent without a similar appearance. The anterior cerebral arteries and right middle cerebral artery are grossly unremarkable. There is hard plaque seen involving the M1 segment on the left. This does result in at least some underlying narrowing in this area. It looks like there additionally some narrowing of the more distal left middle cerebral artery around the M2 area.  Nonvascular findings: There are radiopaque densities in the preseptal left periorbital area and along the scalp in the left frontal region. There is asymmetry to the appearance of the vallecula which is less aerated as compared to the right side of uncertain significance. It looks like there is an old alyse 's fracture involving the spinous process of C7.      Impression: 1.  No significant perfusion abnormality. 2.  Abnormal appearance to the right posterior cerebral artery that might be reflective of a vasculitis or fibromuscular dysplasia. 3.  Hard plaque in the area of the left M1 segment resulting in some narrowing within this area. There additionally is narrowing of the proximal left M2 segment. 4.  Asymmetry in appearance of the vallecula on the left as compared to the right of questionable significance. 5.  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  White matter changes involving the cerebral hemispheres which could reflect more chronic small vessel ischemic change. It  looks like there is old lacunar type infarction right basal ganglia. 7.  There are densities noted within the scalp in the left frontal area and in the preseptal left periorbital region that could reflect small foreign bodies and may relate to old trauma.  This report was finalized on 3/8/2022 12:52 PM by Kevin Olson MD.      CT Angiogram Head w AI Analysis of LVO    Result Date: 3/8/2022  DATE OF EXAM: 3/8/2022 12:07 PM  PROCEDURE: CT CEREBRAL PERFUSION W WO CONTRAST-, CT ANGIOGRAM NECK-, CT ANGIOGRAM HEAD W AI ANALYSIS OF LVO-, CT HEAD WO CONTRAST STROKE PROTOCOL-  INDICATIONS: Neuro deficit, acute, stroke suspected  COMPARISON: No comparisons available.  TECHNIQUE: Routine transaxial cuts were obtained through the head without administration of contrast. Routine transaxial cuts were then obtained through the head following the intravenous administration of 125 mL of Isovue 300. Core blood volume, core blood flow, mean transit time, and Tmax images were obtained utilizing the Rapid software protocol. A limited CT angiogram of the head was also performed to measure the blood vessel density. Additional postcontrast images were obtained through the head and neck.3-D volume rendered reconstructed images were created and reviewed along with the source images  The radiation dose reduction device was turned on for each scan per the ALARA (As Low as Reasonably Achievable) protocol.  FINDINGS: CT head: There are suggested white matter changes involving the cerebral hemispheres that could relate to more chronic small vessel ischemic change. Looks like there may be an old lacunar type infarct in the right basal ganglia. There some hard plaque noted in the area of the left M1 segment. Perfusion:  CBF less than 30% 0 cc Tmax greater than 6 seconds: 0 cc Mismatch volume 0 cc  CTA head and neck: There is some hard plaque in the area of the carotid bulbs. There does not appear to be a significant stenosis by NASCET criteria. The  vertebral arteries seem codominant. On evaluation of the intracranial vasculature the basilar artery is grossly unremarkable. There is somewhat of a beading appearance to the right posterior cerebral artery. The findings do result in some narrowing of the vessel particularly P2 segment. This finding is nonspecific. This could be seen with a vasculitis or fibromuscular dysplasia. The left posterior cerebral artery seems patent without a similar appearance. The anterior cerebral arteries and right middle cerebral artery are grossly unremarkable. There is hard plaque seen involving the M1 segment on the left. This does result in at least some underlying narrowing in this area. It looks like there additionally some narrowing of the more distal left middle cerebral artery around the M2 area.  Nonvascular findings: There are radiopaque densities in the preseptal left periorbital area and along the scalp in the left frontal region. There is asymmetry to the appearance of the vallecula which is less aerated as compared to the right side of uncertain significance. It looks like there is an old alyse 's fracture involving the spinous process of C7.      Impression: 1.  No significant perfusion abnormality. 2.  Abnormal appearance to the right posterior cerebral artery that might be reflective of a vasculitis or fibromuscular dysplasia. 3.  Hard plaque in the area of the left M1 segment resulting in some narrowing within this area. There additionally is narrowing of the proximal left M2 segment. 4.  Asymmetry in appearance of the vallecula on the left as compared to the right of questionable significance. 5.  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  White matter changes involving the cerebral hemispheres which could reflect more chronic small vessel ischemic change. It looks like there is old lacunar type infarction right basal ganglia. 7.  There are densities noted within the scalp in the left frontal area and in the  preseptal left periorbital region that could reflect small foreign bodies and may relate to old trauma.  This report was finalized on 3/8/2022 12:52 PM by Kevin Olson MD.      CT CEREBRAL PERFUSION WITH & WITHOUT CONTRAST    Result Date: 3/8/2022  DATE OF EXAM: 3/8/2022 12:07 PM  PROCEDURE: CT CEREBRAL PERFUSION W WO CONTRAST-, CT ANGIOGRAM NECK-, CT ANGIOGRAM HEAD W AI ANALYSIS OF LVO-, CT HEAD WO CONTRAST STROKE PROTOCOL-  INDICATIONS: Neuro deficit, acute, stroke suspected  COMPARISON: No comparisons available.  TECHNIQUE: Routine transaxial cuts were obtained through the head without administration of contrast. Routine transaxial cuts were then obtained through the head following the intravenous administration of 125 mL of Isovue 300. Core blood volume, core blood flow, mean transit time, and Tmax images were obtained utilizing the Rapid software protocol. A limited CT angiogram of the head was also performed to measure the blood vessel density. Additional postcontrast images were obtained through the head and neck.3-D volume rendered reconstructed images were created and reviewed along with the source images  The radiation dose reduction device was turned on for each scan per the ALARA (As Low as Reasonably Achievable) protocol.  FINDINGS: CT head: There are suggested white matter changes involving the cerebral hemispheres that could relate to more chronic small vessel ischemic change. Looks like there may be an old lacunar type infarct in the right basal ganglia. There some hard plaque noted in the area of the left M1 segment. Perfusion:  CBF less than 30% 0 cc Tmax greater than 6 seconds: 0 cc Mismatch volume 0 cc  CTA head and neck: There is some hard plaque in the area of the carotid bulbs. There does not appear to be a significant stenosis by NASCET criteria. The vertebral arteries seem codominant. On evaluation of the intracranial vasculature the basilar artery is grossly unremarkable. There is somewhat  of a beading appearance to the right posterior cerebral artery. The findings do result in some narrowing of the vessel particularly P2 segment. This finding is nonspecific. This could be seen with a vasculitis or fibromuscular dysplasia. The left posterior cerebral artery seems patent without a similar appearance. The anterior cerebral arteries and right middle cerebral artery are grossly unremarkable. There is hard plaque seen involving the M1 segment on the left. This does result in at least some underlying narrowing in this area. It looks like there additionally some narrowing of the more distal left middle cerebral artery around the M2 area.  Nonvascular findings: There are radiopaque densities in the preseptal left periorbital area and along the scalp in the left frontal region. There is asymmetry to the appearance of the vallecula which is less aerated as compared to the right side of uncertain significance. It looks like there is an old alyse 's fracture involving the spinous process of C7.      Impression: 1.  No significant perfusion abnormality. 2.  Abnormal appearance to the right posterior cerebral artery that might be reflective of a vasculitis or fibromuscular dysplasia. 3.  Hard plaque in the area of the left M1 segment resulting in some narrowing within this area. There additionally is narrowing of the proximal left M2 segment. 4.  Asymmetry in appearance of the vallecula on the left as compared to the right of questionable significance. 5.  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  White matter changes involving the cerebral hemispheres which could reflect more chronic small vessel ischemic change. It looks like there is old lacunar type infarction right basal ganglia. 7.  There are densities noted within the scalp in the left frontal area and in the preseptal left periorbital region that could reflect small foreign bodies and may relate to old trauma.  This report was finalized on 3/8/2022 12:52  PM by Kevin Olson MD.            I have reviewed the medications:  Scheduled Meds:aspirin, 81 mg, Oral, Daily   Or  aspirin, 300 mg, Rectal, Daily  atorvastatin, 80 mg, Oral, Nightly  cholecalciferol, 2,000 Units, Oral, Daily  clopidogrel, 75 mg, Oral, Daily  hydroCHLOROthiazide, 12.5 mg, Oral, Daily  pantoprazole, 40 mg, Oral, QAM  sodium chloride, 10 mL, Intravenous, Q12H      Continuous Infusions:   PRN Meds:.•  acetaminophen **OR** acetaminophen **OR** acetaminophen  •  HYDROcodone-acetaminophen  •  ondansetron **OR** ondansetron  •  sodium chloride  •  sodium chloride  •  sodium chloride  •  zolpidem    Assessment/Plan   Assessment & Plan     Active Hospital Problems    Diagnosis  POA   • Acute right-sided weakness [R53.1]  Yes   • DM2 (diabetes mellitus, type 2) (HCC) [E11.9]  Yes   • Essential hypertension [I10]  Yes      Resolved Hospital Problems   No resolved problems to display.        Brief Hospital Course to date:  Danna Dickinson is a 72 y.o. female with a past medical history of DM2 and hypertension that presented to the ED complaining of right sided weakness, right facial droop and slurred speech. Patient found to have a lacunar infarct.    TIA vs. Acute CVA  Acute right-sided weakness/right facial droop  - stroke/neuro following  - MRI brain w/o: Small acute lacunar infarct of the left posterior limb internal capsule  - TTE with bubble study pending  - Aspirin 81mg PO daily started  - Loaded patient with 300mg of Plavix PO and then patient to take 75mg PO daily x 21 days with Aspirin 81mg, followed by Aspirin 325mg PO daily ongoing  - passed speech evaluation- diet ordered  - PT/OT     HTN  - restart home Losartan     HLD  - lipid panel in am  - patient started on Atorvastatin 80mg PO nightly     DM2  - ssi  - hold home PO meds  - HgA1C: 6.5    DVT prophylaxis:  Mechanical DVT prophylaxis orders are present.      Disposition: I expect the patient to be discharged possible tomorrow pending [facility]  approval.    CODE STATUS:   Code Status and Medical Interventions:   Ordered at: 03/08/22 1432     Level Of Support Discussed With:    Patient     Code Status (Patient has no pulse and is not breathing):    CPR (Attempt to Resuscitate)     Medical Interventions (Patient has pulse or is breathing):    Full Support       Lolita Lazo DO  03/09/22

## 2022-03-09 NOTE — PLAN OF CARE
Patient is alert & oriented x4. She is NSR on room air. She c/o of no pain. Some symptoms have improved since yesterday but gait is still significantly impaired.

## 2022-03-09 NOTE — THERAPY EVALUATION
Patient Name: Danna Dickinson  : 1950    MRN: [id number]                              Today's Date: 3/9/2022       Admit Date: 3/8/2022    Visit Dx:     ICD-10-CM ICD-9-CM   1. Acute right-sided weakness  R53.1 728.87     Patient Active Problem List   Diagnosis   • Acute right-sided weakness   • DM2 (diabetes mellitus, type 2) (HCC)   • Essential hypertension     Past Medical History:   Diagnosis Date   • Diabetes mellitus (HCC)    • Hypertension      Past Surgical History:   Procedure Laterality Date   • APPENDECTOMY     • HYSTERECTOMY     • TONSILLECTOMY        General Information     Row Name 22          OT Time and Intention    Document Type evaluation  -CS     Mode of Treatment occupational therapy  -CS     Row Name 22          General Information    Patient Profile Reviewed yes  -CS     Prior Level of Function independent:;all household mobility;community mobility;ADL's;driving  no AD for mobility at baseline, bath seating in place w/ grab bars  -CS     Existing Precautions/Restrictions fall;other (see comments)  R-sided weakness, limited R shoulder range (previous injury/pain), L hand dominant  -CS     Barriers to Rehab medically complex  -CS     Row Name 22          Living Environment    People in Home spouse  -CS     Row Name 22          Home Main Entrance    Number of Stairs, Main Entrance one  -CS     Row Name 22          Stairs Within Home, Primary    Stairs, Within Home, Primary One level, bath seating in place w/ grab bars  -CS     Number of Stairs, Within Home, Primary none  -CS     Row Name 22          Cognition    Orientation Status (Cognition) oriented x 3  -CS     Row Name 22          Safety Issues, Functional Mobility    Safety Issues Affecting Function (Mobility) insight into deficits/self-awareness;awareness of need for assistance;safety precaution awareness;safety precautions follow-through/compliance  -CS      Impairments Affecting Function (Mobility) balance;coordination;motor control;strength;range of motion (ROM)  -CS     Comment, Safety Issues/Impairments (Mobility) L hand dominant  -           User Key  (r) = Recorded By, (t) = Taken By, (c) = Cosigned By    Initials Name Provider Type    CS Eric Louis OT Occupational Therapist                 Mobility/ADL's     Row Name 03/09/22 0832          Bed Mobility    Bed Mobility supine-sit;scooting/bridging  -CS     Scooting/Bridging Drew (Bed Mobility) minimum assist (75% patient effort);verbal cues  -CS     Supine-Sit Drew (Bed Mobility) minimum assist (75% patient effort);verbal cues  -CS     Assistive Device (Bed Mobility) bed rails;head of bed elevated  -CS     Comment, (Bed Mobility) decreased use of RUE/RLE  -     Row Name 03/09/22 0832          Transfers    Transfers bed-chair transfer;sit-stand transfer;stand-sit transfer  -CS     Comment, (Transfers) RUE support, RLE buckling observed, assisted weight shifting and BUE support for side-steps to recliner, no dizziness reported upon standing  -     Bed-Chair Drew (Transfers) moderate assist (50% patient effort);verbal cues;nonverbal cues (demo/gesture);other (see comments)  -CS     Sit-Stand Drew (Transfers) minimum assist (75% patient effort);verbal cues;nonverbal cues (demo/gesture);other (see comments)  -CS     Stand-Sit Drew (Transfers) verbal cues;moderate assist (50% patient effort);nonverbal cues (demo/gesture)  -     Row Name 03/09/22 0832          Sit-Stand Transfer    Comment, (Sit-Stand Transfer) cues for sequencing  -     Row Name 03/09/22 0832          Functional Mobility    Functional Mobility- Comment defer to PT for specifics  -     Row Name 03/09/22 0832          Activities of Daily Living    BADL Assessment/Intervention upper body dressing;lower body dressing;feeding  -     Row Name 03/09/22 0832          Stand-Sit Transfer    Assistive  Device (Stand-Sit Transfers) other (see comments)  -CS     Comment, (Stand-Sit Transfer) cues for controlled lowering and reachback w/ LUE  -CS     Row Name 03/09/22 0832          Upper Body Dressing Assessment/Training    [place] Level (Upper Body Dressing) don;pajama/robe;moderate assist (50% patient effort)  -CS     Comment, (Upper Body Dressing) appropriate for francois-dressing technique training, decreased use of RUE  -CS     Row Name 03/09/22 0832          Lower Body Dressing Assessment/Training    [place] Level (Lower Body Dressing) don;socks;pants/bottoms;maximum assist (25% patient effort)  -CS     Position (Lower Body Dressing) edge of bed sitting  -CS     Comment, (Lower Body Dressing) RUE coordination/strength deficit  -CS     Row Name 03/09/22 0832          Self-Feeding Assessment/Training    [place] Level (Feeding) prepare tray/open items;dependent (less than 25% patient effort);scoop food and bring to mouth;liquids to mouth;supervision  -CS     Position (Self-Feeding) supported sitting  -CS     Comment, (Feeding) tray prep limited by impaired bimanual skills, L hand dominant for self-feeding  -CS           User Key  (r) = Recorded By, (t) = Taken By, (c) = Cosigned By    Initials Name Provider Type    Eric Moody, OT Occupational Therapist               Obj/Interventions     Row Name 03/09/22 0838          Sensory Assessment (Somatosensory)    Sensory Assessment (Somatosensory) UE sensation intact  -     Sensory Assessment correctly identified all lt touch stimuli presented to BUE w/ vision occluded  -CS     Row Name 03/09/22 0838          Vision Assessment/Intervention    Visual Impairment/Limitations WFL;corrective lenses full-time  -     Row Name 03/09/22 0838          Range of Motion Comprehensive    General Range of Motion upper extremity range of motion deficits identified  -CS     Comment, General Range of Motion LUE grossly WFL, L shoulder ER and flexion limited by pain  - Pt reports baseline R shoulder pain and was seeking consult for treatment  -     Row Name 03/09/22 0838          Shoulder (Therapeutic Exercise)    Shoulder (Therapeutic Exercise) AAROM (active assistive range of motion)  -     Shoulder AAROM (Therapeutic Exercise) left assist right;flexion;extension;external rotation;internal rotation;other (see comments);10 repetitions  w/in pain free range only  -     Row Name 03/09/22 0838          Elbow/Forearm (Therapeutic Exercise)    Elbow/Forearm (Therapeutic Exercise) AROM (active range of motion)  -     Elbow/Forearm AROM (Therapeutic Exercise) left;extension;flexion;10 repetitions  -     Row Name 03/09/22 0838          Wrist (Therapeutic Exercise)    Wrist (Therapeutic Exercise) AROM (active range of motion)  -     Wrist AROM (Therapeutic Exercise) left;flexion;extension;15 repititions  -     Row Name 03/09/22 0838          Hand (Therapeutic Exercise)    Hand (Therapeutic Exercise) AROM (active range of motion);strengthening exercise  -     Hand AROM/AAROM (Therapeutic Exercise) left;AROM (active range of motion);finger extension;finger flexion;finger aBduction;finger aDduction;thumb extension;thumb flexion;5 repetitions  -     Hand Strengthening (Therapeutic Exercise) left; strengthening;thumb pinch strengthening;10 repetitions;squeeze ball/egg;yellow;red  -     Row Name 03/09/22 0838          Motor Skills    Motor Skills functional endurance;coordination  -     Coordination left;upper extremity;gross motor deficit;fine motor deficit;moderate impairment;dysmetria;bimanual skills;minimal impairment  -     Functional Endurance O2 sats stable on RA  -     Therapeutic Exercise shoulder;elbow/forearm;wrist;hand  -     Row Name 03/09/22 0838          Balance    Balance Assessment sitting dynamic balance;sitting static balance;standing static balance;standing dynamic balance  -     Static Sitting Balance supervision  -     Dynamic Sitting  Balance contact guard  -CS     Position, Sitting Balance unsupported;sitting edge of bed  -CS     Static Standing Balance moderate assist  -CS     Dynamic Standing Balance maximum assist  -CS     Position/Device Used, Standing Balance supported;other (see comments)  BUE support  -CS     Balance Interventions sitting;sit to stand;standing;occupation based/functional task;other (see comments)  -CS     Comment, Balance standing balance limited by RLE weakness/buckling  -CS           User Key  (r) = Recorded By, (t) = Taken By, (c) = Cosigned By    Initials Name Provider Type    CS Eric Louis, OT Occupational Therapist               Goals/Plan     Row Name 03/09/22 0850          Bed Mobility Goal 1 (OT)    Activity/Assistive Device (Bed Mobility Goal 1, OT) sit to supine;supine to sit;scooting  -CS     [place] Level/Cues Needed (Bed Mobility Goal 1, OT) contact guard required  -CS     Time Frame (Bed Mobility Goal 1, OT) long term goal (LTG);10 days  -CS     Progress/Outcomes (Bed Mobility Goal 1, OT) goal ongoing  -CS     Row Name 03/09/22 0850          Transfer Goal 1 (OT)    Activity/Assistive Device (Transfer Goal 1, OT) sit-to-stand/stand-to-sit;commode, bedside without drop arms;other (see comments)  AD recs per PT  -CS     Time Frame (Transfer Goal 1, OT) long term goal (LTG);10 days  -CS     Progress/Outcome (Transfer Goal 1, OT) goal ongoing  -CS     Row Name 03/09/22 0850          Dressing Goal 1 (OT)    Activity/Device (Dressing Goal 1, OT) upper body dressing  -CS     [place]/Cues Needed (Dressing Goal 1, OT) standby assist;set-up required  -CS     Time Frame (Dressing Goal 1, OT) long term goal (LTG);10 days  -CS     Strategies/Barriers (Dressing Goal 1, OT) francois-dressing technique pending evolving symptoms  -CS     Progress/Outcome (Dressing Goal 1, OT) goal ongoing  -CS     Row Name 03/09/22 0850          Strength Goal 1 (OT)    Strength Goal 1 (OT) Pt will demo 1/10reps RUE HEP  independently by discharge to promote increased fxl strength for ADL completion  -CS     Time Frame (Strength Goal 1, OT) long term goal (LTG);by discharge  -CS     Progress/Outcome (Strength Goal 1, OT) goal ongoing  -CS     Row Name 03/09/22 0850          Therapy Assessment/Plan (OT)    Planned Therapy Interventions (OT) activity tolerance training;functional balance retraining;occupation/activity based interventions;ROM/therapeutic exercise;strengthening exercise;patient/caregiver education/training;transfer/mobility retraining;adaptive equipment training  -CS           User Key  (r) = Recorded By, (t) = Taken By, (c) = Cosigned By    Initials Name Provider Type    CS Eric Louis, OT Occupational Therapist               Clinical Impression     Row Name 03/09/22 0843          Pain Assessment    Pretreatment Pain Rating 0/10 - no pain  -CS     Posttreatment Pain Rating 0/10 - no pain  -CS     Pre/Posttreatment Pain Comment tolerated eval  -CS     Row Name 03/09/22 0843          Plan of Care Review    Plan of Care Reviewed With patient;spouse  -CS     Progress no change  OT eval  -CS     Outcome Evaluation OT evaluation completed. Baseline ADL completion limited by R-sided weakness and impaired coordination and balance warranting skilled IP OT services to promote return to PLOF. Mod/Max assist required for LB ADLs and fxl mobility limited by RLE buckling. Pt/Spouse educated on foam block and self-AAROM HEP for duncan JIMENEZ understanding, encouraged to perform outside of therapy. Recommend d/c to IP rehab.  -CS     Row Name 03/09/22 0843          Therapy Assessment/Plan (OT)    Rehab Potential (OT) good, to achieve stated therapy goals  -CS     Criteria for Skilled Therapeutic Interventions Met (OT) yes;meets criteria;skilled treatment is necessary  -CS     Therapy Frequency (OT) daily  -CS     Row Name 03/09/22 0843          Therapy Plan Review/Discharge Plan (OT)    Anticipated Discharge Disposition (OT)  inpatient rehabilitation facility  -     Row Name 03/09/22 0843          Vital Signs    Pre Systolic BP Rehab 149  RN cleared for eval, VSS on RA  -CS     Pre Treatment Diastolic BP 79  -CS     Post Systolic BP Rehab 149  -CS     Post Treatment Diastolic BP 74  -CS     O2 Delivery Pre Treatment room air  -CS     O2 Delivery Intra Treatment room air  -CS     Post SpO2 (%) 80  -CS     O2 Delivery Post Treatment room air  -CS     Pre Patient Position Supine  -CS     Intra Patient Position Standing  -CS     Post Patient Position Sitting  -CS     Row Name 03/09/22 0843          Positioning and Restraints    Pre-Treatment Position in bed  -CS     Post Treatment Position chair  -CS     In Chair notified nsg;reclined;sitting;call light within reach;encouraged to call for assist;exit alarm on;RUE elevated;waffle cushion;legs elevated;heels elevated  -           User Key  (r) = Recorded By, (t) = Taken By, (c) = Cosigned By    Initials Name Provider Type     Eric Louis, OT Occupational Therapist               Outcome Measures     Row Name 03/09/22 0853          How much help from another is currently needed...    Putting on and taking off regular lower body clothing? 2  -CS     Bathing (including washing, rinsing, and drying) 2  -CS     Toileting (which includes using toilet bed pan or urinal) 2  -CS     Putting on and taking off regular upper body clothing 3  -CS     Taking care of personal grooming (such as brushing teeth) 3  -CS     Eating meals 3  -CS     AM-PAC 6 Clicks Score (OT) 15  -CS     Row Name 03/09/22 0853          Modified Brooklynn Scale    Modified Brooklynn Scale 4 - Moderately severe disability.  Unable to walk without assistance, and unable to attend to own bodily needs without assistance.  -     Row Name 03/09/22 0853          Functional Assessment    Outcome Measure Options AM-PAC 6 Clicks Daily Activity (OT);Modified Rains  -CS           User Key  (r) = Recorded By, (t) = Taken By, (c) =  Cosigned By    Initials Name Provider Type     Eric Louis OT Occupational Therapist                Occupational Therapy Education                 Title: PT OT SLP Therapies (In Progress)     Topic: Occupational Therapy (Done)     Point: ADL training (Done)     Description:   Instruct learner(s) on proper safety adaptation and remediation techniques during self care or transfers.   Instruct in proper use of assistive devices.              Learning Progress Summary           Patient Acceptance, E,D, DU,NR by  at 3/9/2022 0854                   Point: Home exercise program (Done)     Description:   Instruct learner(s) on appropriate technique for monitoring, assisting and/or progressing therapeutic exercises/activities.              Learning Progress Summary           Patient Acceptance, E,D, DU,NR by  at 3/9/2022 0854                   Point: Precautions (Done)     Description:   Instruct learner(s) on prescribed precautions during self-care and functional transfers.              Learning Progress Summary           Patient Acceptance, E,D, DU,NR by  at 3/9/2022 0854                   Point: Body mechanics (Done)     Description:   Instruct learner(s) on proper positioning and spine alignment during self-care, functional mobility activities and/or exercises.              Learning Progress Summary           Patient Acceptance, E,D, DU,NR by  at 3/9/2022 0854                               User Key     Initials Effective Dates Name Provider Type Discipline     06/16/21 -  Eric Louis OT Occupational Therapist OT              OT Recommendation and Plan  Planned Therapy Interventions (OT): activity tolerance training, functional balance retraining, occupation/activity based interventions, ROM/therapeutic exercise, strengthening exercise, patient/caregiver education/training, transfer/mobility retraining, adaptive equipment training  Therapy Frequency (OT): daily  Plan of Care Review  Plan of Care  Reviewed With: patient, spouse  Progress: no change (OT eval)  Outcome Evaluation: OT evaluation completed. Baseline ADL completion limited by R-sided weakness and impaired coordination and balance warranting skilled IP OT services to promote return to PLOF. Mod/Max assist required for LB ADLs and fxl mobility limited by RLE buckling. Pt/Spouse educated on foam block and self-AAROM HEP for RUE, demo'd understanding, encouraged to perform outside of therapy. Recommend d/c to IP rehab.     Time Calculation:    Time Calculation- OT     Row Name 03/09/22 0855             Time Calculation- OT    OT Start Time 0750  -CS      OT Received On 03/09/22  -CS      OT Goal Re-Cert Due Date 03/19/22  -CS              Timed Charges    06767 - OT Therapeutic Exercise Minutes 6  -CS      31519 - OT Self Care/Mgmt Minutes 4  -CS              Untimed Charges    OT Eval/Re-eval Minutes 48  -CS              Total Minutes    Timed Charges Total Minutes 10  -CS      Untimed Charges Total Minutes 48  -CS       Total Minutes 58  -CS            User Key  (r) = Recorded By, (t) = Taken By, (c) = Cosigned By    Initials Name Provider Type    CS Eric Louis, OT Occupational Therapist              Therapy Charges for Today     Code Description Service Date Service Provider Modifiers Qty    [id number]  OT THER PROC EA 15 MIN 3/9/2022 Eric Louis OT GO 1    [id number] HC OT EVAL LOW COMPLEXITY 4 3/9/2022 Eric Louis OT GO 1               Eric Louis OT  3/9/2022

## 2022-03-10 LAB
ALBUMIN SERPL-MCNC: 3.7 G/DL (ref 3.5–5.2)
ALBUMIN/GLOB SERPL: 1.2 G/DL
ALP SERPL-CCNC: 90 U/L (ref 39–117)
ALT SERPL W P-5'-P-CCNC: 10 U/L (ref 1–33)
ANION GAP SERPL CALCULATED.3IONS-SCNC: 12 MMOL/L (ref 5–15)
AST SERPL-CCNC: 14 U/L (ref 1–32)
BILIRUB SERPL-MCNC: 0.3 MG/DL (ref 0–1.2)
BUN SERPL-MCNC: 18 MG/DL (ref 8–23)
BUN/CREAT SERPL: 20.5 (ref 7–25)
CALCIUM SPEC-SCNC: 9.2 MG/DL (ref 8.6–10.5)
CHLORIDE SERPL-SCNC: 104 MMOL/L (ref 98–107)
CO2 SERPL-SCNC: 23 MMOL/L (ref 22–29)
CREAT SERPL-MCNC: 0.88 MG/DL (ref 0.57–1)
DEPRECATED RDW RBC AUTO: 40.1 FL (ref 37–54)
EGFRCR SERPLBLD CKD-EPI 2021: 70.8 ML/MIN/1.73
ERYTHROCYTE [DISTWIDTH] IN BLOOD BY AUTOMATED COUNT: 12.7 % (ref 12.3–15.4)
GLOBULIN UR ELPH-MCNC: 3 GM/DL
GLUCOSE BLDC GLUCOMTR-MCNC: 128 MG/DL (ref 70–130)
GLUCOSE BLDC GLUCOMTR-MCNC: 142 MG/DL (ref 70–130)
GLUCOSE BLDC GLUCOMTR-MCNC: 172 MG/DL (ref 70–130)
GLUCOSE BLDC GLUCOMTR-MCNC: 176 MG/DL (ref 70–130)
GLUCOSE BLDC GLUCOMTR-MCNC: 221 MG/DL (ref 70–130)
GLUCOSE SERPL-MCNC: 130 MG/DL (ref 65–99)
HCT VFR BLD AUTO: 36.7 % (ref 34–46.6)
HGB BLD-MCNC: 12.5 G/DL (ref 12–15.9)
MCH RBC QN AUTO: 29.4 PG (ref 26.6–33)
MCHC RBC AUTO-ENTMCNC: 34.1 G/DL (ref 31.5–35.7)
MCV RBC AUTO: 86.4 FL (ref 79–97)
PLATELET # BLD AUTO: 373 10*3/MM3 (ref 140–450)
PMV BLD AUTO: 10.1 FL (ref 6–12)
POTASSIUM SERPL-SCNC: 4.1 MMOL/L (ref 3.5–5.2)
PROT SERPL-MCNC: 6.7 G/DL (ref 6–8.5)
RBC # BLD AUTO: 4.25 10*6/MM3 (ref 3.77–5.28)
SODIUM SERPL-SCNC: 139 MMOL/L (ref 136–145)
WBC NRBC COR # BLD: 9.49 10*3/MM3 (ref 3.4–10.8)

## 2022-03-10 PROCEDURE — 97116 GAIT TRAINING THERAPY: CPT

## 2022-03-10 PROCEDURE — 99233 SBSQ HOSP IP/OBS HIGH 50: CPT | Performed by: FAMILY MEDICINE

## 2022-03-10 PROCEDURE — 85027 COMPLETE CBC AUTOMATED: CPT | Performed by: HOSPITALIST

## 2022-03-10 PROCEDURE — 82962 GLUCOSE BLOOD TEST: CPT

## 2022-03-10 PROCEDURE — 97110 THERAPEUTIC EXERCISES: CPT

## 2022-03-10 PROCEDURE — 80053 COMPREHEN METABOLIC PANEL: CPT | Performed by: HOSPITALIST

## 2022-03-10 PROCEDURE — 63710000001 INSULIN LISPRO (HUMAN) PER 5 UNITS: Performed by: FAMILY MEDICINE

## 2022-03-10 PROCEDURE — 99231 SBSQ HOSP IP/OBS SF/LOW 25: CPT | Performed by: NURSE PRACTITIONER

## 2022-03-10 RX ORDER — NICOTINE POLACRILEX 4 MG
15 LOZENGE BUCCAL
Status: DISCONTINUED | OUTPATIENT
Start: 2022-03-10 | End: 2022-03-11 | Stop reason: HOSPADM

## 2022-03-10 RX ORDER — DEXTROSE MONOHYDRATE 25 G/50ML
25 INJECTION, SOLUTION INTRAVENOUS
Status: DISCONTINUED | OUTPATIENT
Start: 2022-03-10 | End: 2022-03-11 | Stop reason: HOSPADM

## 2022-03-10 RX ADMIN — Medication 2000 UNITS: at 08:37

## 2022-03-10 RX ADMIN — Medication 10 ML: at 20:43

## 2022-03-10 RX ADMIN — Medication 10 ML: at 08:37

## 2022-03-10 RX ADMIN — HYDROCHLOROTHIAZIDE 12.5 MG: 12.5 CAPSULE ORAL at 08:37

## 2022-03-10 RX ADMIN — ATORVASTATIN CALCIUM 80 MG: 40 TABLET, FILM COATED ORAL at 20:42

## 2022-03-10 RX ADMIN — PANTOPRAZOLE SODIUM 40 MG: 40 TABLET, DELAYED RELEASE ORAL at 06:10

## 2022-03-10 RX ADMIN — ASPIRIN 81 MG 81 MG: 81 TABLET ORAL at 08:37

## 2022-03-10 RX ADMIN — INSULIN LISPRO 2 UNITS: 100 INJECTION, SOLUTION INTRAVENOUS; SUBCUTANEOUS at 20:42

## 2022-03-10 RX ADMIN — CLOPIDOGREL BISULFATE 75 MG: 75 TABLET ORAL at 08:37

## 2022-03-10 NOTE — CASE MANAGEMENT/SOCIAL WORK
Continued Stay Note  [facility]     Patient Name: Danna Dickinson  MRN: [id number]  Today's Date: 3/10/2022    Admit Date: 3/8/2022     Discharge Plan     Row Name 03/10/22 1420       Plan    Plan [facility]    Patient/Family in Agreement with Plan yes    Plan Comments Patient's plan is an acute rehab bed at [facility] on the stroke unit tomorrow, 3/11.  Nurse to call report to [phone].  Facility will get transfer summary out of [facility].  [facility] will transport at 1130.  Patient needs to be at the Maternity entrance by 1125.   will continue to follow.    Final Discharge Disposition Code 62 - inpatient rehab facility               Discharge Codes    No documentation.               Expected Discharge Date and Time     Expected Discharge Date Expected Discharge Time    Mar 11, 2022             Nora Holm RN

## 2022-03-10 NOTE — PROGRESS NOTES
Stroke Progress Note       Chief Complaint:  Right-sided weakness, right facial droop and slurred speech    Subjective    Subjective     Subjective:  Patient is resting in bed comfortably. Her  is at bedside. Reviewed with them her new medications and plan of care. They have no questions or concerns at this time. No acute events overnight. Plan is for patient to go to [facility].     Review of Systems   Constitutional: Positive for fatigue.   HENT: Negative.    Eyes: Negative for visual disturbance.   Respiratory: Negative.    Cardiovascular: Negative.    Gastrointestinal: Negative.    Musculoskeletal: Negative.    Skin: Negative.    Neurological: Positive for facial asymmetry, speech difficulty and weakness.   Psychiatric/Behavioral: Negative.             Objective    Objective      Temp:  [98 °F (36.7 °C)-98.3 °F (36.8 °C)] 98.3 °F (36.8 °C)  Heart Rate:  [62-97] 70  Resp:  [16-18] 16  BP: (141-168)/(72-84) 154/72        Neurological Exam  Mental Status  Awake and alert. Oriented to person, place and time. Oriented to person, place, and time. Memory is normal. Recent and remote memory are intact. Mild dysarthria present. Language is fluent with no aphasia. Attention and concentration are normal. Fund of knowledge is appropriate for level of education.    Cranial Nerves  CN II: Right visual acuity: normal. Left visual acuity: normal. Right normal visual field. Left normal visual field.  CN III, IV, VI: Extraocular movements intact bilaterally. Pupils equal round and reactive to light bilaterally.  CN V: Facial sensation is normal.  CN VII:  Right: There is central facial weakness.  Left: There is no facial weakness.  CN VIII: Bilateral hearing appears to be intact.  CN IX, X: Palate elevates symmetrically  CN XI:  Right: Sternocleidomastoid strength is weak. Trapezius strength is weak.  CN XII: Tongue midline without atrophy or fasciculations.    Motor  Normal muscle bulk throughout. No fasciculations present.  Normal muscle tone. No abnormal involuntary movements. Strength is 5/5 in all four extremities except as noted.  Right sided upper and lower extremity weakness 4/5.    Sensory  Normal sensation.Light touch is normal in upper and lower extremities.     Coordination  Right: Finger-to-nose abnormality: Slow with finger to nose.  Missed the finger and she touched her upper lip. She is ataxic with her right upper extremity.    Gait   Abnormal gait.  Not assessed but patient reports she walked with a walker during PT today .      Physical Exam  Constitutional:       General: She is awake. She is not in acute distress.     Appearance: She is obese.   HENT:      Head: Normocephalic and atraumatic.      Mouth/Throat:      Mouth: Mucous membranes are moist.      Pharynx: Oropharynx is clear.   Eyes:      Extraocular Movements: Extraocular movements intact.      Pupils: Pupils are equal, round, and reactive to light.   Cardiovascular:      Rate and Rhythm: Normal rate and regular rhythm.   Pulmonary:      Effort: Pulmonary effort is normal. No respiratory distress.   Musculoskeletal:      Right lower leg: No edema.      Left lower leg: No edema.   Skin:     General: Skin is warm and dry.   Neurological:      Mental Status: She is alert and oriented to person, place, and time.      Cranial Nerves: Dysarthria and facial asymmetry present.      Sensory: Sensation is intact.      Motor: Weakness present.      Coordination: Finger-Nose-Finger Test abnormal.      Gait: Gait abnormal.   Psychiatric:         Attention and Perception: Attention normal.         Speech: Speech is delayed and slurred.         Behavior: Behavior is cooperative.         Cognition and Memory: Cognition and memory normal.         Results Review:    I reviewed the patient's new clinical results.  Results for orders placed during the hospital encounter of 03/08/22    Adult Transthoracic Echo Complete W/ Cont if Necessary Per Protocol (With Agitated  Saline)    Interpretation Summary  · Saline test results are negative.  · Left ventricular wall thickness is consistent with mild concentric hypertrophy. Sigmoid-shaped ventricular septum is present.  · Estimated left ventricular EF was in agreement with the calculated left ventricular EF. Left ventricular ejection fraction appears to be 66 - 70%.  · Estimated right ventricular systolic pressure from tricuspid regurgitation is normal (<35 mmHg). Calculated right ventricular systolic pressure from tricuspid regurgitation is 27 mmHg.  · Left ventricular diastolic function is consistent with age.  · There is an abnormal LVOT contour but no evidence of an outflow tract obstruction       WBC   Date Value Ref Range Status   03/10/2022 9.49 3.40 - 10.80 10*3/mm3 Final     RBC   Date Value Ref Range Status   03/10/2022 4.25 3.77 - 5.28 10*6/mm3 Final     Hemoglobin   Date Value Ref Range Status   03/10/2022 12.5 12.0 - 15.9 g/dL Final     Hematocrit   Date Value Ref Range Status   03/10/2022 36.7 34.0 - 46.6 % Final     MCV   Date Value Ref Range Status   03/10/2022 86.4 79.0 - 97.0 fL Final     MCH   Date Value Ref Range Status   03/10/2022 29.4 26.6 - 33.0 pg Final     MCHC   Date Value Ref Range Status   03/10/2022 34.1 31.5 - 35.7 g/dL Final     RDW   Date Value Ref Range Status   03/10/2022 12.7 12.3 - 15.4 % Final     RDW-SD   Date Value Ref Range Status   03/10/2022 40.1 37.0 - 54.0 fl Final     MPV   Date Value Ref Range Status   03/10/2022 10.1 6.0 - 12.0 fL Final     Platelets   Date Value Ref Range Status   03/10/2022 373 140 - 450 10*3/mm3 Final     Neutrophil %   Date Value Ref Range Status   03/08/2022 76.6 (H) 42.7 - 76.0 % Final     Lymphocyte %   Date Value Ref Range Status   03/08/2022 15.9 (L) 19.6 - 45.3 % Final     Monocyte %   Date Value Ref Range Status   03/08/2022 6.0 5.0 - 12.0 % Final     Eosinophil %   Date Value Ref Range Status   03/08/2022 0.5 0.3 - 6.2 % Final     Basophil %   Date Value Ref  Range Status   03/08/2022 0.5 0.0 - 1.5 % Final     Immature Grans %   Date Value Ref Range Status   03/08/2022 0.5 0.0 - 0.5 % Final     Neutrophils, Absolute   Date Value Ref Range Status   03/08/2022 7.93 (H) 1.70 - 7.00 10*3/mm3 Final     Lymphocytes, Absolute   Date Value Ref Range Status   03/08/2022 1.65 0.70 - 3.10 10*3/mm3 Final     Monocytes, Absolute   Date Value Ref Range Status   03/08/2022 0.62 0.10 - 0.90 10*3/mm3 Final     Eosinophils, Absolute   Date Value Ref Range Status   03/08/2022 0.05 0.00 - 0.40 10*3/mm3 Final     Basophils, Absolute   Date Value Ref Range Status   03/08/2022 0.05 0.00 - 0.20 10*3/mm3 Final     Immature Grans, Absolute   Date Value Ref Range Status   03/08/2022 0.05 0.00 - 0.05 10*3/mm3 Final     nRBC   Date Value Ref Range Status   03/08/2022 0.0 0.0 - 0.2 /100 WBC Final               Assessment/Plan     Assessment/Plan:  This is a 72-year-old right-handed  female with a past medical history of diabetes mellitus type 2 and hypertension.  She presents to [facility] ED via EMS for further evaluation of right-sided weakness, right facial droop, and slurred speech.  Her LKW was yesterday at 11:30 PM.  She is not eligible for IV thrombolytic therapy due to LKW being greater than 4.5 hours.  Her noncontrast head CT, CTA head and neck, and CT cerebral perfusion revealed hard plaque in the left M1 segment with narrowing in the area.  In addition, there was narrowing of the left M2 segment.  An old lacunar infarct of the right basal ganglia was noted.  No large vessel occlusion or perfusion abnormalities were seen.       1. 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 Pt warrants  skilled IP PT to improve                               independence with mobility and return to PLOF.  Rec IPR upon d/c.   -Fall precautions              -Follow up in stroke clinic in 4-6 weeks  2. Hypertension              -Please allow for normalized blood pressure. Goal of -160.    -Avoid hypotension              -Ok to start home antihypertensives   3. Hyperlipidemia              -, ,               -Atorvastatin 80 mg nightly  4. Diabetes mellitus type 2              -History of T2DM              -Hemoglobin A1c: 6.50, A1C is < 7 no need for diabetic education               -Euglycemic goal of < 140              -Management per primary team     Plan of care discussed with Dr. Hope, the patient, the patient's spouse, and primary team.  Neurology stroke will sign off at this time. Follow up in stroke clinic in 4-6 weeks.  If there are any questions or concerns please feel free to reach out.       Vibha Wilhelm, APRN  03/10/22  10:45 EST

## 2022-03-10 NOTE — PLAN OF CARE
Goal Outcome Evaluation:   VSS. Pt A/ox4. NSR to ST. Room air. No complaints of pain. NIH 5. Pt denies further needs at this time.

## 2022-03-10 NOTE — PROGRESS NOTES
[facility] Medicine Services  PROGRESS NOTE    Patient Name: Danna Dickinson  : 1950  MRN: [id number]    Date of Admission: 3/8/2022  Primary Care Physician: Alvarado Ferrell MD    Subjective   Subjective     CC:  F/u Right sided weakness, right facial droop, and slurred speech    HPI:  Pt reports that strength on right side and speech is improving     ROS:  Gen- No fevers, chills  CV- No chest pain, palpitations  Resp- No cough, dyspnea  GI- No N/V/D, abd pain    Objective   Objective     Vital Signs:   Temp:  [98 °F (36.7 °C)-98.3 °F (36.8 °C)] 98.3 °F (36.8 °C)  Heart Rate:  [62-97] 70  Resp:  [16-18] 16  BP: (141-168)/(72-84) 154/72     Physical Exam:  Constitutional: No acute distress, awake, alert, female sitting up in chair   HENT: NCAT, mucous membranes moist  Respiratory: Clear to auscultation bilaterally, respiratory effort normal   Cardiovascular: RRR, no murmurs, rubs, or gallops  Gastrointestinal: Positive bowel sounds, soft, nontender, nondistended  Musculoskeletal: No bilateral ankle edema  Psychiatric: Appropriate affect, cooperative  Neurologic: Oriented x 3, right sided weakness, speech slightly slurred   Skin: No rashes      Results Reviewed:  LAB RESULTS:      Lab 03/10/22  0548 22  0528 22  1316 22  1208 22  1206   WBC 9.49 9.65 10.35  --   --    HEMOGLOBIN 12.5 12.3 12.9  --   --    HEMOGLOBIN, POC  --   --   --   --  13.3   HEMATOCRIT 36.7 36.6 36.9  --   --    HEMATOCRIT POC  --   --   --   --  39   PLATELETS 373 370 356  --   --    NEUTROS ABS  --   --  7.93*  --   --    IMMATURE GRANS (ABS)  --   --  0.05  --   --    LYMPHS ABS  --   --  1.65  --   --    MONOS ABS  --   --  0.62  --   --    EOS ABS  --   --  0.05  --   --    MCV 86.4 88.2 86.4  --   --    APTT  --   --   --  24.5  --          Lab 03/10/22  0548 22  1058 22  0528 22  1205   SODIUM 139 141  --   --    POTASSIUM 4.1 4.1  --   --    CHLORIDE 104 103  --   --     CO2 23.0 25.0  --   --    ANION GAP 12.0 13.0  --   --    BUN 18 18  --   --    CREATININE 0.88 0.93  --  1.00   EGFR 70.8 66.3  --   --    GLUCOSE 130* 145*  --   --    CALCIUM 9.2 9.5  --   --    HEMOGLOBIN A1C  --   --  6.50*  --    TSH  --  1.850  --   --          Lab 03/10/22  0548 03/09/22  1058 03/08/22  1316   TOTAL PROTEIN 6.7 7.1  --    ALBUMIN 3.70 3.90  --    GLOBULIN 3.0 3.2  --    ALT (SGPT) 10 11 10   AST (SGOT) 14 12 10   BILIRUBIN 0.3 0.3  --    ALK PHOS 90 93  --          Lab 03/08/22  1316   TROPONIN T <0.010         Lab 03/09/22  1058   CHOLESTEROL 195   LDL CHOL 112*   HDL CHOL 55   TRIGLYCERIDES 160*             Lab 03/08/22  1206   PH, ARTERIAL 7.47     Brief Urine Lab Results     None          Microbiology Results Abnormal     None          Adult Transthoracic Echo Complete W/ Cont if Necessary Per Protocol (With Agitated Saline)    Result Date: 3/9/2022  · Saline test results are negative. · Left ventricular wall thickness is consistent with mild concentric hypertrophy. Sigmoid-shaped ventricular septum is present. · Estimated left ventricular EF was in agreement with the calculated left ventricular EF. Left ventricular ejection fraction appears to be 66 - 70%. · Estimated right ventricular systolic pressure from tricuspid regurgitation is normal (<35 mmHg). Calculated right ventricular systolic pressure from tricuspid regurgitation is 27 mmHg. · Left ventricular diastolic function is consistent with age. · There is an abnormal LVOT contour but no evidence of an outflow tract obstruction      CT Angiogram Neck    Result Date: 3/8/2022  DATE OF EXAM: 3/8/2022 12:07 PM  PROCEDURE: CT CEREBRAL PERFUSION W WO CONTRAST-, CT ANGIOGRAM NECK-, CT ANGIOGRAM HEAD W AI ANALYSIS OF LVO-, CT HEAD WO CONTRAST STROKE PROTOCOL-  INDICATIONS: Neuro deficit, acute, stroke suspected  COMPARISON: No comparisons available.  TECHNIQUE: Routine transaxial cuts were obtained through the head without  administration of contrast. Routine transaxial cuts were then obtained through the head following the intravenous administration of 125 mL of Isovue 300. Core blood volume, core blood flow, mean transit time, and Tmax images were obtained utilizing the Rapid software protocol. A limited CT angiogram of the head was also performed to measure the blood vessel density. Additional postcontrast images were obtained through the head and neck.3-D volume rendered reconstructed images were created and reviewed along with the source images  The radiation dose reduction device was turned on for each scan per the ALARA (As Low as Reasonably Achievable) protocol.  FINDINGS: CT head: There are suggested white matter changes involving the cerebral hemispheres that could relate to more chronic small vessel ischemic change. Looks like there may be an old lacunar type infarct in the right basal ganglia. There some hard plaque noted in the area of the left M1 segment. Perfusion:  CBF less than 30% 0 cc Tmax greater than 6 seconds: 0 cc Mismatch volume 0 cc  CTA head and neck: There is some hard plaque in the area of the carotid bulbs. There does not appear to be a significant stenosis by NASCET criteria. The vertebral arteries seem codominant. On evaluation of the intracranial vasculature the basilar artery is grossly unremarkable. There is somewhat of a beading appearance to the right posterior cerebral artery. The findings do result in some narrowing of the vessel particularly P2 segment. This finding is nonspecific. This could be seen with a vasculitis or fibromuscular dysplasia. The left posterior cerebral artery seems patent without a similar appearance. The anterior cerebral arteries and right middle cerebral artery are grossly unremarkable. There is hard plaque seen involving the M1 segment on the left. This does result in at least some underlying narrowing in this area. It looks like there additionally some narrowing of the  more distal left middle cerebral artery around the M2 area.  Nonvascular findings: There are radiopaque densities in the preseptal left periorbital area and along the scalp in the left frontal region. There is asymmetry to the appearance of the vallecula which is less aerated as compared to the right side of uncertain significance. It looks like there is an old alyse 's fracture involving the spinous process of C7.      Impression: 1.  No significant perfusion abnormality. 2.  Abnormal appearance to the right posterior cerebral artery that might be reflective of a vasculitis or fibromuscular dysplasia. 3.  Hard plaque in the area of the left M1 segment resulting in some narrowing within this area. There additionally is narrowing of the proximal left M2 segment. 4.  Asymmetry in appearance of the vallecula on the left as compared to the right of questionable significance. 5.  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  White matter changes involving the cerebral hemispheres which could reflect more chronic small vessel ischemic change. It looks like there is old lacunar type infarction right basal ganglia. 7.  There are densities noted within the scalp in the left frontal area and in the preseptal left periorbital region that could reflect small foreign bodies and may relate to old trauma.  This report was finalized on 3/8/2022 12:52 PM by Kevin Olson MD.      MRI Brain Without Contrast    Result Date: 3/8/2022  DATE OF EXAM: 3/8/2022 4:00 PM  PROCEDURE: MRI BRAIN WO CONTRAST-  INDICATIONS: Stroke, follow up; R53.1-Weakness  COMPARISON: CT head earlier the same day  TECHNIQUE: Multiplanar multisequence images of the brain were performed without contrast according to routine brain MRI protocol.  FINDINGS: There is a tiny area of diffusion restriction along the left posterior limb internal capsule concerning for small acute lacunar infarct. There is no evidence of associated hemorrhage or significant mass effect.  Midline structures are unremarkable and the craniocervical junction is satisfactory in appearance. Fairly advanced age-related changes are otherwise noted with moderate generalized volume loss in addition to T2 hyperintense periventricular white matter changes of chronic small vessel ischemia. There is otherwise no evidence of intracranial hemorrhage, mass or mass effect. There is ex vacuo prominence of the lateral ventricles related to surrounding volume loss. The orbits are unremarkable and the paranasal sinuses are grossly clear.      Impression: Small acute lacunar infarct of the left posterior limb internal capsule. No additional territorial ischemia. No associated hemorrhage or mass effect.  Relatively advanced age-related changes otherwise present as above.  This report was finalized on 3/8/2022 4:26 PM by Wilfrid Da Silva.      XR Chest 1 View    Result Date: 3/8/2022  DATE OF EXAM: 3/8/2022 12:47 PM  PROCEDURE: XR CHEST 1 VW-  INDICATIONS: Acute Stroke Protocol (onset < 12 hrs)  COMPARISON: No comparisons available.  TECHNIQUE: Single radiographic AP view of the chest was obtained.  FINDINGS: The heart is not definitely enlarged. It looks like there is a hiatal hernia. There are no infiltrates or obvious pleural effusions. The visualized osseous structures do not appear unusual.      Impression: 1.  There is some cardiomegaly. 2.  Suggested hiatal hernia.  This report was finalized on 3/8/2022 12:59 PM by Kevin Olson MD.      CT Head Without Contrast Stroke Protocol    Result Date: 3/8/2022  DATE OF EXAM: 3/8/2022 12:07 PM  PROCEDURE: CT CEREBRAL PERFUSION W WO CONTRAST-, CT ANGIOGRAM NECK-, CT ANGIOGRAM HEAD W AI ANALYSIS OF LVO-, CT HEAD WO CONTRAST STROKE PROTOCOL-  INDICATIONS: Neuro deficit, acute, stroke suspected  COMPARISON: No comparisons available.  TECHNIQUE: Routine transaxial cuts were obtained through the head without administration of contrast. Routine transaxial cuts were then obtained  through the head following the intravenous administration of 125 mL of Isovue 300. Core blood volume, core blood flow, mean transit time, and Tmax images were obtained utilizing the Rapid software protocol. A limited CT angiogram of the head was also performed to measure the blood vessel density. Additional postcontrast images were obtained through the head and neck.3-D volume rendered reconstructed images were created and reviewed along with the source images  The radiation dose reduction device was turned on for each scan per the ALARA (As Low as Reasonably Achievable) protocol.  FINDINGS: CT head: There are suggested white matter changes involving the cerebral hemispheres that could relate to more chronic small vessel ischemic change. Looks like there may be an old lacunar type infarct in the right basal ganglia. There some hard plaque noted in the area of the left M1 segment. Perfusion:  CBF less than 30% 0 cc Tmax greater than 6 seconds: 0 cc Mismatch volume 0 cc  CTA head and neck: There is some hard plaque in the area of the carotid bulbs. There does not appear to be a significant stenosis by NASCET criteria. The vertebral arteries seem codominant. On evaluation of the intracranial vasculature the basilar artery is grossly unremarkable. There is somewhat of a beading appearance to the right posterior cerebral artery. The findings do result in some narrowing of the vessel particularly P2 segment. This finding is nonspecific. This could be seen with a vasculitis or fibromuscular dysplasia. The left posterior cerebral artery seems patent without a similar appearance. The anterior cerebral arteries and right middle cerebral artery are grossly unremarkable. There is hard plaque seen involving the M1 segment on the left. This does result in at least some underlying narrowing in this area. It looks like there additionally some narrowing of the more distal left middle cerebral artery around the M2 area.  Nonvascular  findings: There are radiopaque densities in the preseptal left periorbital area and along the scalp in the left frontal region. There is asymmetry to the appearance of the vallecula which is less aerated as compared to the right side of uncertain significance. It looks like there is an old alyse 's fracture involving the spinous process of C7.      Impression: 1.  No significant perfusion abnormality. 2.  Abnormal appearance to the right posterior cerebral artery that might be reflective of a vasculitis or fibromuscular dysplasia. 3.  Hard plaque in the area of the left M1 segment resulting in some narrowing within this area. There additionally is narrowing of the proximal left M2 segment. 4.  Asymmetry in appearance of the vallecula on the left as compared to the right of questionable significance. 5.  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  White matter changes involving the cerebral hemispheres which could reflect more chronic small vessel ischemic change. It looks like there is old lacunar type infarction right basal ganglia. 7.  There are densities noted within the scalp in the left frontal area and in the preseptal left periorbital region that could reflect small foreign bodies and may relate to old trauma.  This report was finalized on 3/8/2022 12:52 PM by Kevin Olson MD.      CT Angiogram Head w AI Analysis of LVO    Result Date: 3/8/2022  DATE OF EXAM: 3/8/2022 12:07 PM  PROCEDURE: CT CEREBRAL PERFUSION W WO CONTRAST-, CT ANGIOGRAM NECK-, CT ANGIOGRAM HEAD W AI ANALYSIS OF LVO-, CT HEAD WO CONTRAST STROKE PROTOCOL-  INDICATIONS: Neuro deficit, acute, stroke suspected  COMPARISON: No comparisons available.  TECHNIQUE: Routine transaxial cuts were obtained through the head without administration of contrast. Routine transaxial cuts were then obtained through the head following the intravenous administration of 125 mL of Isovue 300. Core blood volume, core blood flow, mean transit time, and Tmax images  were obtained utilizing the Rapid software protocol. A limited CT angiogram of the head was also performed to measure the blood vessel density. Additional postcontrast images were obtained through the head and neck.3-D volume rendered reconstructed images were created and reviewed along with the source images  The radiation dose reduction device was turned on for each scan per the ALARA (As Low as Reasonably Achievable) protocol.  FINDINGS: CT head: There are suggested white matter changes involving the cerebral hemispheres that could relate to more chronic small vessel ischemic change. Looks like there may be an old lacunar type infarct in the right basal ganglia. There some hard plaque noted in the area of the left M1 segment. Perfusion:  CBF less than 30% 0 cc Tmax greater than 6 seconds: 0 cc Mismatch volume 0 cc  CTA head and neck: There is some hard plaque in the area of the carotid bulbs. There does not appear to be a significant stenosis by NASCET criteria. The vertebral arteries seem codominant. On evaluation of the intracranial vasculature the basilar artery is grossly unremarkable. There is somewhat of a beading appearance to the right posterior cerebral artery. The findings do result in some narrowing of the vessel particularly P2 segment. This finding is nonspecific. This could be seen with a vasculitis or fibromuscular dysplasia. The left posterior cerebral artery seems patent without a similar appearance. The anterior cerebral arteries and right middle cerebral artery are grossly unremarkable. There is hard plaque seen involving the M1 segment on the left. This does result in at least some underlying narrowing in this area. It looks like there additionally some narrowing of the more distal left middle cerebral artery around the M2 area.  Nonvascular findings: There are radiopaque densities in the preseptal left periorbital area and along the scalp in the left frontal region. There is asymmetry to the  appearance of the vallecula which is less aerated as compared to the right side of uncertain significance. It looks like there is an old alyse 's fracture involving the spinous process of C7.      Impression: 1.  No significant perfusion abnormality. 2.  Abnormal appearance to the right posterior cerebral artery that might be reflective of a vasculitis or fibromuscular dysplasia. 3.  Hard plaque in the area of the left M1 segment resulting in some narrowing within this area. There additionally is narrowing of the proximal left M2 segment. 4.  Asymmetry in appearance of the vallecula on the left as compared to the right of questionable significance. 5.  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  White matter changes involving the cerebral hemispheres which could reflect more chronic small vessel ischemic change. It looks like there is old lacunar type infarction right basal ganglia. 7.  There are densities noted within the scalp in the left frontal area and in the preseptal left periorbital region that could reflect small foreign bodies and may relate to old trauma.  This report was finalized on 3/8/2022 12:52 PM by Kevin Olson MD.      CT CEREBRAL PERFUSION WITH & WITHOUT CONTRAST    Result Date: 3/8/2022  DATE OF EXAM: 3/8/2022 12:07 PM  PROCEDURE: CT CEREBRAL PERFUSION W WO CONTRAST-, CT ANGIOGRAM NECK-, CT ANGIOGRAM HEAD W AI ANALYSIS OF LVO-, CT HEAD WO CONTRAST STROKE PROTOCOL-  INDICATIONS: Neuro deficit, acute, stroke suspected  COMPARISON: No comparisons available.  TECHNIQUE: Routine transaxial cuts were obtained through the head without administration of contrast. Routine transaxial cuts were then obtained through the head following the intravenous administration of 125 mL of Isovue 300. Core blood volume, core blood flow, mean transit time, and Tmax images were obtained utilizing the Rapid software protocol. A limited CT angiogram of the head was also performed to measure the blood vessel density.  Additional postcontrast images were obtained through the head and neck.3-D volume rendered reconstructed images were created and reviewed along with the source images  The radiation dose reduction device was turned on for each scan per the ALARA (As Low as Reasonably Achievable) protocol.  FINDINGS: CT head: There are suggested white matter changes involving the cerebral hemispheres that could relate to more chronic small vessel ischemic change. Looks like there may be an old lacunar type infarct in the right basal ganglia. There some hard plaque noted in the area of the left M1 segment. Perfusion:  CBF less than 30% 0 cc Tmax greater than 6 seconds: 0 cc Mismatch volume 0 cc  CTA head and neck: There is some hard plaque in the area of the carotid bulbs. There does not appear to be a significant stenosis by NASCET criteria. The vertebral arteries seem codominant. On evaluation of the intracranial vasculature the basilar artery is grossly unremarkable. There is somewhat of a beading appearance to the right posterior cerebral artery. The findings do result in some narrowing of the vessel particularly P2 segment. This finding is nonspecific. This could be seen with a vasculitis or fibromuscular dysplasia. The left posterior cerebral artery seems patent without a similar appearance. The anterior cerebral arteries and right middle cerebral artery are grossly unremarkable. There is hard plaque seen involving the M1 segment on the left. This does result in at least some underlying narrowing in this area. It looks like there additionally some narrowing of the more distal left middle cerebral artery around the M2 area.  Nonvascular findings: There are radiopaque densities in the preseptal left periorbital area and along the scalp in the left frontal region. There is asymmetry to the appearance of the vallecula which is less aerated as compared to the right side of uncertain significance. It looks like there is an old alyse  's fracture involving the spinous process of C7.      Impression: 1.  No significant perfusion abnormality. 2.  Abnormal appearance to the right posterior cerebral artery that might be reflective of a vasculitis or fibromuscular dysplasia. 3.  Hard plaque in the area of the left M1 segment resulting in some narrowing within this area. There additionally is narrowing of the proximal left M2 segment. 4.  Asymmetry in appearance of the vallecula on the left as compared to the right of questionable significance. 5.  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  White matter changes involving the cerebral hemispheres which could reflect more chronic small vessel ischemic change. It looks like there is old lacunar type infarction right basal ganglia. 7.  There are densities noted within the scalp in the left frontal area and in the preseptal left periorbital region that could reflect small foreign bodies and may relate to old trauma.  This report was finalized on 3/8/2022 12:52 PM by Kevin Olson MD.        Results for orders placed during the hospital encounter of 03/08/22    Adult Transthoracic Echo Complete W/ Cont if Necessary Per Protocol (With Agitated Saline)    Interpretation Summary  · Saline test results are negative.  · Left ventricular wall thickness is consistent with mild concentric hypertrophy. Sigmoid-shaped ventricular septum is present.  · Estimated left ventricular EF was in agreement with the calculated left ventricular EF. Left ventricular ejection fraction appears to be 66 - 70%.  · Estimated right ventricular systolic pressure from tricuspid regurgitation is normal (<35 mmHg). Calculated right ventricular systolic pressure from tricuspid regurgitation is 27 mmHg.  · Left ventricular diastolic function is consistent with age.  · There is an abnormal LVOT contour but no evidence of an outflow tract obstruction      I have reviewed the medications:  Scheduled Meds:aspirin, 81 mg, Oral, Daily    Or  aspirin, 300 mg, Rectal, Daily  atorvastatin, 80 mg, Oral, Nightly  cholecalciferol, 2,000 Units, Oral, Daily  clopidogrel, 75 mg, Oral, Daily  hydroCHLOROthiazide, 12.5 mg, Oral, Daily  pantoprazole, 40 mg, Oral, QAM  sodium chloride, 10 mL, Intravenous, Q12H      Continuous Infusions:   PRN Meds:.•  acetaminophen **OR** acetaminophen **OR** acetaminophen  •  HYDROcodone-acetaminophen  •  ondansetron **OR** ondansetron  •  sodium chloride  •  sodium chloride  •  sodium chloride  •  zolpidem    Assessment/Plan   Assessment & Plan     Active Hospital Problems    Diagnosis  POA   • Acute right-sided weakness [R53.1]  Yes   • DM2 (diabetes mellitus, type 2) (HCC) [E11.9]  Yes   • Essential hypertension [I10]  Yes      Resolved Hospital Problems   No resolved problems to display.        Brief Hospital Course to date:  Danna Dickinson is a 72 y.o. female with a past medical history of DM2 and hypertension that presented to the ED complaining of right sided weakness, right facial droop and slurred speech. Patient found to have a lacunar infarct.    TIA vs. Acute CVA  Acute right-sided weakness/right facial droop  - stroke/neuro following  - MRI brain w/o: Small acute lacunar infarct of the left posterior limb internal capsule  - TTE with bubble study- saline test negative. Ef 66-70%    - Aspirin 81mg PO daily started  - Loaded patient with 300mg of Plavix PO and then patient to take 75mg PO daily x 21 days with Aspirin 81mg, followed by Aspirin 325mg PO daily ongoing  - PT/OT     HTN  - restart home Losartan     HLD  - lipid panel in am  - patient started on Atorvastatin 80mg PO nightly     DM2  - ssi  - hold home PO meds  - HgA1C: 6.5    DVT prophylaxis:  Mechanical DVT prophylaxis orders are present.   AM-PAC 6 Clicks Score (PT): 15 (03/09/22 1308)  Disposition: I expect the patient to be discharged to [facility] at 1130 tomorrow     CODE STATUS:   Code Status and Medical Interventions:   Ordered at: 03/08/22 1432     Level  Of Support Discussed With:    Patient     Code Status (Patient has no pulse and is not breathing):    CPR (Attempt to Resuscitate)     Medical Interventions (Patient has pulse or is breathing):    Full Support       Anisa Rubio,   03/10/22

## 2022-03-10 NOTE — PLAN OF CARE
Goal Outcome Evaluation:  Plan of Care Reviewed With: patient, spouse        Progress: improving  Outcome Evaluation: Good toleration to activity with increased ambulation distance and improved level of assist today.  Pt ambulated 40ft with FWW and minAx2 progressing to modAx2 for last few feet d/t increased fatigue.  Improved coordination/control of RLE during ambulation activity noted by progressing to step through gait pattern for ~75% of time and being able to keep more adequate TANI majority of time with occasional increased adduction at RLE when taking a step.  Demo'd good effort and participation with BLE ther ex.  Con to progress pt as able per PT POC.  Rec IPR upon d/c.

## 2022-03-10 NOTE — PLAN OF CARE
Patient is improving in all goals of care both nursing and therapy. She will be discharged to [facility] tomorrow.

## 2022-03-11 VITALS
TEMPERATURE: 98.3 F | SYSTOLIC BLOOD PRESSURE: 171 MMHG | WEIGHT: 194 LBS | HEIGHT: 60 IN | HEART RATE: 95 BPM | BODY MASS INDEX: 38.09 KG/M2 | RESPIRATION RATE: 16 BRPM | OXYGEN SATURATION: 96 % | DIASTOLIC BLOOD PRESSURE: 84 MMHG

## 2022-03-11 LAB — GLUCOSE BLDC GLUCOMTR-MCNC: 133 MG/DL (ref 70–130)

## 2022-03-11 PROCEDURE — 82962 GLUCOSE BLOOD TEST: CPT

## 2022-03-11 PROCEDURE — 99239 HOSP IP/OBS DSCHRG MGMT >30: CPT | Performed by: NURSE PRACTITIONER

## 2022-03-11 RX ORDER — PANTOPRAZOLE SODIUM 40 MG/1
40 TABLET, DELAYED RELEASE ORAL EVERY MORNING
Start: 2022-03-12 | End: 2022-04-13

## 2022-03-11 RX ORDER — ZOLPIDEM TARTRATE 5 MG/1
5 TABLET ORAL NIGHTLY PRN
Start: 2022-03-11

## 2022-03-11 RX ORDER — CLOPIDOGREL BISULFATE 75 MG/1
75 TABLET ORAL DAILY
Qty: 30 TABLET
Start: 2022-03-12 | End: 2022-04-01

## 2022-03-11 RX ORDER — ATORVASTATIN CALCIUM 80 MG/1
80 TABLET, FILM COATED ORAL NIGHTLY
Start: 2022-03-11

## 2022-03-11 RX ORDER — ASPIRIN 325 MG
325 TABLET, DELAYED RELEASE (ENTERIC COATED) ORAL DAILY
Start: 2022-04-02

## 2022-03-11 RX ORDER — ASPIRIN 81 MG/1
81 TABLET, CHEWABLE ORAL DAILY
Start: 2022-03-12 | End: 2022-04-01

## 2022-03-11 RX ADMIN — HYDROCHLOROTHIAZIDE 12.5 MG: 12.5 CAPSULE ORAL at 08:22

## 2022-03-11 RX ADMIN — Medication 10 ML: at 08:23

## 2022-03-11 RX ADMIN — CLOPIDOGREL BISULFATE 75 MG: 75 TABLET ORAL at 08:23

## 2022-03-11 RX ADMIN — PANTOPRAZOLE SODIUM 40 MG: 40 TABLET, DELAYED RELEASE ORAL at 06:12

## 2022-03-11 RX ADMIN — Medication 2000 UNITS: at 08:23

## 2022-03-11 RX ADMIN — ASPIRIN 81 MG 81 MG: 81 TABLET ORAL at 08:22

## 2022-03-11 NOTE — CASE MANAGEMENT/SOCIAL WORK
Case Management Discharge Note      Final Note: Discharge summary faxed to [phone].         Selected Continued Care - Admitted Since 3/8/2022     Destination Coordination complete.    Service Provider Selected Services Address Phone Fax Patient Preferred    [facility]  Inpatient Rehabilitation [address] [facility] [id number] [phone] --          Durable Medical Equipment    No services have been selected for the patient.              Dialysis/Infusion    No services have been selected for the patient.              Home Medical Care    No services have been selected for the patient.              Therapy    No services have been selected for the patient.              Community Resources    No services have been selected for the patient.              Community & DME    No services have been selected for the patient.                       Final Discharge Disposition Code: 62 - inpatient rehab facility

## 2022-03-11 NOTE — DISCHARGE PLACEMENT REQUEST
"Danna Chatman (72 y.o. Female)     Case Management  [phone]              Date of Birth   1950    Social Security Number       Address   [address] [address]    Home Phone   [phone]    MRN   [id number]       [facility]    Marital Status                               Admission Date   3/8/22    Admission Type   Emergency    Admitting Provider   Anisa Rubio DO    Attending Provider   Anisa Rubio DO    Department, Room/Bed   [facility] 3F, S303/1       Discharge Date       Discharge Disposition   Rehab Facility or Unit (DC - External)    Discharge Destination                               Attending Provider: Anisa Rubio DO    Allergies: Lisinopril    Isolation: None   Infection: None   Code Status: CPR   Advance Care Planning Activity    Ht: 152.4 cm (60\")   Wt: 88 kg (194 lb 0.1 oz)    Admission Cmt: None   Principal Problem: None                Active Insurance as of 3/8/2022     Primary Coverage     Payor Plan Insurance Group Employer/Plan Group    HUMANA MEDICARE REPLACEMENT HUMANA MEDICARE REPLACEMENT [id number]     Payor Plan Address Payor Plan Phone Number Payor Plan Fax Number Effective Dates    [address] [address] [phone]  2018 - None Entered    [facility] [address]       Subscriber Name Subscriber Birth Date Member ID       DANNA CHATMAN 1950 [id number]                 Emergency Contacts      (Rel.) Home Phone Work Phone Mobile Phone    LOUIE CHATMAN (Spouse) -- -- [phone]                 Discharge Summary      Jia Maldonado APRN at [address] [place] [address] Medicine Services  DISCHARGE SUMMARY    Patient Name: Danna Chatman  : 1950  MRN: [id number]    Date of Admission: 3/8/2022 11:54 AM  Date of Discharge: 22  Primary Care Physician: Alvarado Ferrell MD    Consults     Date and Time Order Name Status Description    " 3/8/2022 12:55 PM Inpatient Neurology Consult Stroke      3/8/2022 11:55 AM Inpatient Neurology Consult Stroke            Hospital Course     Presenting Problem:   Acute right-sided weakness [R53.1]    Active Hospital Problems    Diagnosis  POA   • Acute right-sided weakness [R53.1]  Yes   • DM2 (diabetes mellitus, type 2) (HCC) [E11.9]  Yes   • Essential hypertension [I10]  Yes      Resolved Hospital Problems   No resolved problems to display.          Hospital Course:  Danna Dickinson is a 72 y.o. female with a past medical history of DM2 and hypertension that presented to the ED on 3/8/2022 complaining of right sided weakness, right facial droop and slurred speech. Patient found to have a lacunar infarct. MRI of the brain revealed small acute lacunar infarct of the left posterior limb internal capsule. TTE with bubble study was negative for PFO. Neurology recommended to continue DAPT with ASA 81 mg and Plavix 75 mg daily for 21 days then  mg daily alone. She will continue Atorvastatin 80 mg daily.  has arranged for inpatient PT/OT at [facility]. She will follow up with Neurology in 4-6 weeks.  The patient will discharge today to [facility] today in stable condition.     Acute CVA  Acute right-sided weakness/right facial droop  - stroke/neuro following  - MRI brain w/o: Small acute lacunar infarct of the left posterior limb internal capsule  - TTE with bubble study- saline test negative. Ef 66-70%    - Aspirin 81mg PO daily started  - Loaded patient with 300mg of Plavix PO and then patient to take 75mg PO daily x 21 days with Aspirin 81mg, followed by Aspirin 325mg PO daily ongoing  - PT/OT recommended inpatient therapy      HTN  - restart home Losartan     HLD  -started on Atorvastatin 80mg PO nightly      DM2  - Resume home regimen   - HgA1C: 6.5    Discharge Follow Up Recommendations for outpatient labs/diagnostics:  Follow up with PCP in 1 week.   Follow up with Neurology in 4-6 weeks.     Day of Discharge      HPI:   Seen resting in bed no apparent distress.  No acute events overnight per nursing.  She continues to have right-sided weakness.  She is aware of plan to discharge to [facility] for inpatient rehab today and is agreeable.  We discussed the importance of taking all medications as prescribed and keeping all recommended follow-up appointments.    Review of Systems  Gen- No fevers, chills  CV- No chest pain, palpitations  Resp- No cough, dyspnea  GI- No N/V/D, abd pain    Vital Signs:   Temp:  [97.9 °F (36.6 °C)-98.3 °F (36.8 °C)] 98.2 °F (36.8 °C)  Heart Rate:  [56-98] 79  Resp:  [16] 16  BP: (144-176)/(68-99) 152/68      Physical Exam:  Constitutional: No acute distress, awake, alert  HENT: NCAT, mucous membranes moist  Respiratory: Clear to auscultation bilaterally, respiratory effort normal   Cardiovascular: RRR, no murmurs, cap refill brisk   Gastrointestinal: Positive bowel sounds, soft, nontender, nondistended  Musculoskeletal: No BLE edema   Psychiatric: Appropriate affect, cooperative  Neurologic: Oriented x 3, right-sided weakness, mild dysarthria  Skin: warm, dry, no visible rash     Pertinent  and/or Most Recent Results     LAB RESULTS:      Lab 03/10/22  0548 03/09/22  0528 03/08/22  1316 03/08/22  1208 03/08/22  1206   WBC 9.49 9.65 10.35  --   --    HEMOGLOBIN 12.5 12.3 12.9  --   --    HEMOGLOBIN, POC  --   --   --   --  13.3   HEMATOCRIT 36.7 36.6 36.9  --   --    HEMATOCRIT POC  --   --   --   --  39   PLATELETS 373 370 356  --   --    NEUTROS ABS  --   --  7.93*  --   --    IMMATURE GRANS (ABS)  --   --  0.05  --   --    LYMPHS ABS  --   --  1.65  --   --    MONOS ABS  --   --  0.62  --   --    EOS ABS  --   --  0.05  --   --    MCV 86.4 88.2 86.4  --   --    APTT  --   --   --  24.5  --          Lab 03/10/22  0548 03/09/22  1058 03/09/22  0528 03/08/22  1205   SODIUM 139 141  --   --    POTASSIUM 4.1 4.1  --   --    CHLORIDE 104 103  --   --    CO2 23.0 25.0  --   --    ANION GAP 12.0  13.0  --   --    BUN 18 18  --   --    CREATININE 0.88 0.93  --  1.00   EGFR 70.8 66.3  --   --    GLUCOSE 130* 145*  --   --    CALCIUM 9.2 9.5  --   --    HEMOGLOBIN A1C  --   --  6.50*  --    TSH  --  1.850  --   --          Lab 03/10/22  0548 03/09/22  1058 03/08/22  1316   TOTAL PROTEIN 6.7 7.1  --    ALBUMIN 3.70 3.90  --    GLOBULIN 3.0 3.2  --    ALT (SGPT) 10 11 10   AST (SGOT) 14 12 10   BILIRUBIN 0.3 0.3  --    ALK PHOS 90 93  --          Lab 03/08/22  1316   TROPONIN T <0.010         Lab 03/09/22  1058   CHOLESTEROL 195   LDL CHOL 112*   HDL CHOL 55   TRIGLYCERIDES 160*             Lab 03/08/22  1206   PH, ARTERIAL 7.47     Brief Urine Lab Results     None        Microbiology Results (last 10 days)     ** No results found for the last 240 hours. **          Adult Transthoracic Echo Complete W/ Cont if Necessary Per Protocol (With Agitated Saline)    Result Date: 3/9/2022  · Saline test results are negative. · Left ventricular wall thickness is consistent with mild concentric hypertrophy. Sigmoid-shaped ventricular septum is present. · Estimated left ventricular EF was in agreement with the calculated left ventricular EF. Left ventricular ejection fraction appears to be 66 - 70%. · Estimated right ventricular systolic pressure from tricuspid regurgitation is normal (<35 mmHg). Calculated right ventricular systolic pressure from tricuspid regurgitation is 27 mmHg. · Left ventricular diastolic function is consistent with age. · There is an abnormal LVOT contour but no evidence of an outflow tract obstruction      CT Angiogram Neck    Result Date: 3/8/2022  DATE OF EXAM: 3/8/2022 12:07 PM  PROCEDURE: CT CEREBRAL PERFUSION W WO CONTRAST-, CT ANGIOGRAM NECK-, CT ANGIOGRAM HEAD W AI ANALYSIS OF LVO-, CT HEAD WO CONTRAST STROKE PROTOCOL-  INDICATIONS: Neuro deficit, acute, stroke suspected  COMPARISON: No comparisons available.  TECHNIQUE: Routine transaxial cuts were obtained through the head without  administration of contrast. Routine transaxial cuts were then obtained through the head following the intravenous administration of 125 mL of Isovue 300. Core blood volume, core blood flow, mean transit time, and Tmax images were obtained utilizing the Rapid software protocol. A limited CT angiogram of the head was also performed to measure the blood vessel density. Additional postcontrast images were obtained through the head and neck.3-D volume rendered reconstructed images were created and reviewed along with the source images  The radiation dose reduction device was turned on for each scan per the ALARA (As Low as Reasonably Achievable) protocol.  FINDINGS: CT head: There are suggested white matter changes involving the cerebral hemispheres that could relate to more chronic small vessel ischemic change. Looks like there may be an old lacunar type infarct in the right basal ganglia. There some hard plaque noted in the area of the left M1 segment. Perfusion:  CBF less than 30% 0 cc Tmax greater than 6 seconds: 0 cc Mismatch volume 0 cc  CTA head and neck: There is some hard plaque in the area of the carotid bulbs. There does not appear to be a significant stenosis by NASCET criteria. The vertebral arteries seem codominant. On evaluation of the intracranial vasculature the basilar artery is grossly unremarkable. There is somewhat of a beading appearance to the right posterior cerebral artery. The findings do result in some narrowing of the vessel particularly P2 segment. This finding is nonspecific. This could be seen with a vasculitis or fibromuscular dysplasia. The left posterior cerebral artery seems patent without a similar appearance. The anterior cerebral arteries and right middle cerebral artery are grossly unremarkable. There is hard plaque seen involving the M1 segment on the left. This does result in at least some underlying narrowing in this area. It looks like there additionally some narrowing of the  more distal left middle cerebral artery around the M2 area.  Nonvascular findings: There are radiopaque densities in the preseptal left periorbital area and along the scalp in the left frontal region. There is asymmetry to the appearance of the vallecula which is less aerated as compared to the right side of uncertain significance. It looks like there is an old alyse 's fracture involving the spinous process of C7.      1.  No significant perfusion abnormality. 2.  Abnormal appearance to the right posterior cerebral artery that might be reflective of a vasculitis or fibromuscular dysplasia. 3.  Hard plaque in the area of the left M1 segment resulting in some narrowing within this area. There additionally is narrowing of the proximal left M2 segment. 4.  Asymmetry in appearance of the vallecula on the left as compared to the right of questionable significance. 5.  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  White matter changes involving the cerebral hemispheres which could reflect more chronic small vessel ischemic change. It looks like there is old lacunar type infarction right basal ganglia. 7.  There are densities noted within the scalp in the left frontal area and in the preseptal left periorbital region that could reflect small foreign bodies and may relate to old trauma.  This report was finalized on 3/8/2022 12:52 PM by Kevin Olson MD.      MRI Brain Without Contrast    Result Date: 3/8/2022  DATE OF EXAM: 3/8/2022 4:00 PM  PROCEDURE: MRI BRAIN WO CONTRAST-  INDICATIONS: Stroke, follow up; R53.1-Weakness  COMPARISON: CT head earlier the same day  TECHNIQUE: Multiplanar multisequence images of the brain were performed without contrast according to routine brain MRI protocol.  FINDINGS: There is a tiny area of diffusion restriction along the left posterior limb internal capsule concerning for small acute lacunar infarct. There is no evidence of associated hemorrhage or significant mass effect. Midline  structures are unremarkable and the craniocervical junction is satisfactory in appearance. Fairly advanced age-related changes are otherwise noted with moderate generalized volume loss in addition to T2 hyperintense periventricular white matter changes of chronic small vessel ischemia. There is otherwise no evidence of intracranial hemorrhage, mass or mass effect. There is ex vacuo prominence of the lateral ventricles related to surrounding volume loss. The orbits are unremarkable and the paranasal sinuses are grossly clear.      Small acute lacunar infarct of the left posterior limb internal capsule. No additional territorial ischemia. No associated hemorrhage or mass effect.  Relatively advanced age-related changes otherwise present as above.  This report was finalized on 3/8/2022 4:26 PM by Wilfrid Da Silva.      XR Chest 1 View    Result Date: 3/8/2022  DATE OF EXAM: 3/8/2022 12:47 PM  PROCEDURE: XR CHEST 1 VW-  INDICATIONS: Acute Stroke Protocol (onset < 12 hrs)  COMPARISON: No comparisons available.  TECHNIQUE: Single radiographic AP view of the chest was obtained.  FINDINGS: The heart is not definitely enlarged. It looks like there is a hiatal hernia. There are no infiltrates or obvious pleural effusions. The visualized osseous structures do not appear unusual.      1.  There is some cardiomegaly. 2.  Suggested hiatal hernia.  This report was finalized on 3/8/2022 12:59 PM by Kevin Olson MD.      CT Head Without Contrast Stroke Protocol    Result Date: 3/8/2022  DATE OF EXAM: 3/8/2022 12:07 PM  PROCEDURE: CT CEREBRAL PERFUSION W WO CONTRAST-, CT ANGIOGRAM NECK-, CT ANGIOGRAM HEAD W AI ANALYSIS OF LVO-, CT HEAD WO CONTRAST STROKE PROTOCOL-  INDICATIONS: Neuro deficit, acute, stroke suspected  COMPARISON: No comparisons available.  TECHNIQUE: Routine transaxial cuts were obtained through the head without administration of contrast. Routine transaxial cuts were then obtained through the head following the  intravenous administration of 125 mL of Isovue 300. Core blood volume, core blood flow, mean transit time, and Tmax images were obtained utilizing the Rapid software protocol. A limited CT angiogram of the head was also performed to measure the blood vessel density. Additional postcontrast images were obtained through the head and neck.3-D volume rendered reconstructed images were created and reviewed along with the source images  The radiation dose reduction device was turned on for each scan per the ALARA (As Low as Reasonably Achievable) protocol.  FINDINGS: CT head: There are suggested white matter changes involving the cerebral hemispheres that could relate to more chronic small vessel ischemic change. Looks like there may be an old lacunar type infarct in the right basal ganglia. There some hard plaque noted in the area of the left M1 segment. Perfusion:  CBF less than 30% 0 cc Tmax greater than 6 seconds: 0 cc Mismatch volume 0 cc  CTA head and neck: There is some hard plaque in the area of the carotid bulbs. There does not appear to be a significant stenosis by NASCET criteria. The vertebral arteries seem codominant. On evaluation of the intracranial vasculature the basilar artery is grossly unremarkable. There is somewhat of a beading appearance to the right posterior cerebral artery. The findings do result in some narrowing of the vessel particularly P2 segment. This finding is nonspecific. This could be seen with a vasculitis or fibromuscular dysplasia. The left posterior cerebral artery seems patent without a similar appearance. The anterior cerebral arteries and right middle cerebral artery are grossly unremarkable. There is hard plaque seen involving the M1 segment on the left. This does result in at least some underlying narrowing in this area. It looks like there additionally some narrowing of the more distal left middle cerebral artery around the M2 area.  Nonvascular findings: There are radiopaque  densities in the preseptal left periorbital area and along the scalp in the left frontal region. There is asymmetry to the appearance of the vallecula which is less aerated as compared to the right side of uncertain significance. It looks like there is an old alyse 's fracture involving the spinous process of C7.      1.  No significant perfusion abnormality. 2.  Abnormal appearance to the right posterior cerebral artery that might be reflective of a vasculitis or fibromuscular dysplasia. 3.  Hard plaque in the area of the left M1 segment resulting in some narrowing within this area. There additionally is narrowing of the proximal left M2 segment. 4.  Asymmetry in appearance of the vallecula on the left as compared to the right of questionable significance. 5.  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  White matter changes involving the cerebral hemispheres which could reflect more chronic small vessel ischemic change. It looks like there is old lacunar type infarction right basal ganglia. 7.  There are densities noted within the scalp in the left frontal area and in the preseptal left periorbital region that could reflect small foreign bodies and may relate to old trauma.  This report was finalized on 3/8/2022 12:52 PM by Kevin Olson MD.      CT Angiogram Head w AI Analysis of LVO    Result Date: 3/8/2022  DATE OF EXAM: 3/8/2022 12:07 PM  PROCEDURE: CT CEREBRAL PERFUSION W WO CONTRAST-, CT ANGIOGRAM NECK-, CT ANGIOGRAM HEAD W AI ANALYSIS OF LVO-, CT HEAD WO CONTRAST STROKE PROTOCOL-  INDICATIONS: Neuro deficit, acute, stroke suspected  COMPARISON: No comparisons available.  TECHNIQUE: Routine transaxial cuts were obtained through the head without administration of contrast. Routine transaxial cuts were then obtained through the head following the intravenous administration of 125 mL of Isovue 300. Core blood volume, core blood flow, mean transit time, and Tmax images were obtained utilizing the Rapid software  protocol. A limited CT angiogram of the head was also performed to measure the blood vessel density. Additional postcontrast images were obtained through the head and neck.3-D volume rendered reconstructed images were created and reviewed along with the source images  The radiation dose reduction device was turned on for each scan per the ALARA (As Low as Reasonably Achievable) protocol.  FINDINGS: CT head: There are suggested white matter changes involving the cerebral hemispheres that could relate to more chronic small vessel ischemic change. Looks like there may be an old lacunar type infarct in the right basal ganglia. There some hard plaque noted in the area of the left M1 segment. Perfusion:  CBF less than 30% 0 cc Tmax greater than 6 seconds: 0 cc Mismatch volume 0 cc  CTA head and neck: There is some hard plaque in the area of the carotid bulbs. There does not appear to be a significant stenosis by NASCET criteria. The vertebral arteries seem codominant. On evaluation of the intracranial vasculature the basilar artery is grossly unremarkable. There is somewhat of a beading appearance to the right posterior cerebral artery. The findings do result in some narrowing of the vessel particularly P2 segment. This finding is nonspecific. This could be seen with a vasculitis or fibromuscular dysplasia. The left posterior cerebral artery seems patent without a similar appearance. The anterior cerebral arteries and right middle cerebral artery are grossly unremarkable. There is hard plaque seen involving the M1 segment on the left. This does result in at least some underlying narrowing in this area. It looks like there additionally some narrowing of the more distal left middle cerebral artery around the M2 area.  Nonvascular findings: There are radiopaque densities in the preseptal left periorbital area and along the scalp in the left frontal region. There is asymmetry to the appearance of the vallecula which is less  aerated as compared to the right side of uncertain significance. It looks like there is an old alyse 's fracture involving the spinous process of C7.      1.  No significant perfusion abnormality. 2.  Abnormal appearance to the right posterior cerebral artery that might be reflective of a vasculitis or fibromuscular dysplasia. 3.  Hard plaque in the area of the left M1 segment resulting in some narrowing within this area. There additionally is narrowing of the proximal left M2 segment. 4.  Asymmetry in appearance of the vallecula on the left as compared to the right of questionable significance. 5.  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  White matter changes involving the cerebral hemispheres which could reflect more chronic small vessel ischemic change. It looks like there is old lacunar type infarction right basal ganglia. 7.  There are densities noted within the scalp in the left frontal area and in the preseptal left periorbital region that could reflect small foreign bodies and may relate to old trauma.  This report was finalized on 3/8/2022 12:52 PM by Kevin Olson MD.      CT CEREBRAL PERFUSION WITH & WITHOUT CONTRAST    Result Date: 3/8/2022  DATE OF EXAM: 3/8/2022 12:07 PM  PROCEDURE: CT CEREBRAL PERFUSION W WO CONTRAST-, CT ANGIOGRAM NECK-, CT ANGIOGRAM HEAD W AI ANALYSIS OF LVO-, CT HEAD WO CONTRAST STROKE PROTOCOL-  INDICATIONS: Neuro deficit, acute, stroke suspected  COMPARISON: No comparisons available.  TECHNIQUE: Routine transaxial cuts were obtained through the head without administration of contrast. Routine transaxial cuts were then obtained through the head following the intravenous administration of 125 mL of Isovue 300. Core blood volume, core blood flow, mean transit time, and Tmax images were obtained utilizing the Rapid software protocol. A limited CT angiogram of the head was also performed to measure the blood vessel density. Additional postcontrast images were obtained through the  head and neck.3-D volume rendered reconstructed images were created and reviewed along with the source images  The radiation dose reduction device was turned on for each scan per the ALARA (As Low as Reasonably Achievable) protocol.  FINDINGS: CT head: There are suggested white matter changes involving the cerebral hemispheres that could relate to more chronic small vessel ischemic change. Looks like there may be an old lacunar type infarct in the right basal ganglia. There some hard plaque noted in the area of the left M1 segment. Perfusion:  CBF less than 30% 0 cc Tmax greater than 6 seconds: 0 cc Mismatch volume 0 cc  CTA head and neck: There is some hard plaque in the area of the carotid bulbs. There does not appear to be a significant stenosis by NASCET criteria. The vertebral arteries seem codominant. On evaluation of the intracranial vasculature the basilar artery is grossly unremarkable. There is somewhat of a beading appearance to the right posterior cerebral artery. The findings do result in some narrowing of the vessel particularly P2 segment. This finding is nonspecific. This could be seen with a vasculitis or fibromuscular dysplasia. The left posterior cerebral artery seems patent without a similar appearance. The anterior cerebral arteries and right middle cerebral artery are grossly unremarkable. There is hard plaque seen involving the M1 segment on the left. This does result in at least some underlying narrowing in this area. It looks like there additionally some narrowing of the more distal left middle cerebral artery around the M2 area.  Nonvascular findings: There are radiopaque densities in the preseptal left periorbital area and along the scalp in the left frontal region. There is asymmetry to the appearance of the vallecula which is less aerated as compared to the right side of uncertain significance. It looks like there is an old alyse 's fracture involving the spinous process of C7.       1.  No significant perfusion abnormality. 2.  Abnormal appearance to the right posterior cerebral artery that might be reflective of a vasculitis or fibromuscular dysplasia. 3.  Hard plaque in the area of the left M1 segment resulting in some narrowing within this area. There additionally is narrowing of the proximal left M2 segment. 4.  Asymmetry in appearance of the vallecula on the left as compared to the right of questionable significance. 5.  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  White matter changes involving the cerebral hemispheres which could reflect more chronic small vessel ischemic change. It looks like there is old lacunar type infarction right basal ganglia. 7.  There are densities noted within the scalp in the left frontal area and in the preseptal left periorbital region that could reflect small foreign bodies and may relate to old trauma.  This report was finalized on 3/8/2022 12:52 PM by Kevin Olson MD.                Results for orders placed during the hospital encounter of 03/08/22    Adult Transthoracic Echo Complete W/ Cont if Necessary Per Protocol (With Agitated Saline)    Interpretation Summary  · Saline test results are negative.  · Left ventricular wall thickness is consistent with mild concentric hypertrophy. Sigmoid-shaped ventricular septum is present.  · Estimated left ventricular EF was in agreement with the calculated left ventricular EF. Left ventricular ejection fraction appears to be 66 - 70%.  · Estimated right ventricular systolic pressure from tricuspid regurgitation is normal (<35 mmHg). Calculated right ventricular systolic pressure from tricuspid regurgitation is 27 mmHg.  · Left ventricular diastolic function is consistent with age.  · There is an abnormal LVOT contour but no evidence of an outflow tract obstruction      Plan for Follow-up of Pending Labs/Results: Follow-up with PCP in 1 week.    Discharge Details        Discharge Medications      New Medications       Instructions Start Date   aspirin 81 MG chewable tablet   81 mg, Oral, Daily   Start Date: March 12, 2022     aspirin  MG tablet   325 mg, Oral, Daily   Start Date: April 2, 2022     atorvastatin 80 MG tablet  Commonly known as: LIPITOR   80 mg, Oral, Nightly      clopidogrel 75 MG tablet  Commonly known as: PLAVIX   75 mg, Oral, Daily   Start Date: March 12, 2022     pantoprazole 40 MG EC tablet  Commonly known as: PROTONIX  Replaces: omeprazole 20 MG capsule   40 mg, Oral, Every Morning   Start Date: March 12, 2022        Continue These Medications      Instructions Start Date   cholecalciferol 25 MCG (1000 UT) tablet  Commonly known as: VITAMIN D3   2,000 Units, Oral, Daily      glipizide 2.5 MG 24 hr tablet  Commonly known as: GLUCOTROL XL   2.5 mg, Oral, Daily      hydroCHLOROthiazide 12.5 MG tablet  Commonly known as: HYDRODIURIL   12.5 mg, Oral, Daily      losartan 50 MG tablet  Commonly known as: COZAAR   50 mg, Oral, Daily      zolpidem 5 MG tablet  Commonly known as: AMBIEN   5 mg, Oral, Nightly PRN         Stop These Medications    omeprazole 20 MG capsule  Commonly known as: priLOSEC  Replaced by: pantoprazole 40 MG EC tablet            Allergies   Allergen Reactions   • Lisinopril Cough         Discharge Disposition:  Rehab Facility or Unit (DC - External)    Diet:  Hospital:  Diet Order   Procedures   • Diet Regular; Thin; Consistent Carbohydrate       Activity:  Activity Instructions     Activity as Tolerated               CODE STATUS:    Code Status and Medical Interventions:   Ordered at: 03/08/22 1432     Level Of Support Discussed With:    Patient     Code Status (Patient has no pulse and is not breathing):    CPR (Attempt to Resuscitate)     Medical Interventions (Patient has pulse or is breathing):    Full Support       No future appointments.    Additional Instructions for the Follow-ups that You Need to Schedule     Discharge Follow-up with PCP   As directed       Currently Documented  PCP:    Alvarado Ferrell MD    PCP Phone Number:    [phone]     Follow Up Details: Follow up with PCP in 1 week.         Discharge Follow-up with Specified Provider: Follow up with Neurology in 4-6 weeks.   As directed      To: Follow up with Neurology in 4-6 weeks.                     VALENCIA Torres  03/11/22      Time Spent on Discharge:  I spent  39 minutes on this discharge activity which included: face-to-face encounter with the patient, reviewing the data in the system, coordination of the care with the nursing staff as well as consultants, documentation, and entering orders.          Electronically signed by Jai Maldonado APRN at 03/11/22 1028

## 2022-03-11 NOTE — PLAN OF CARE
Goal Outcome Evaluation:   VSS. SB to NSR, tachy with activity. Room air. NIH 3. No complaints of pain. Pt denies further needs at this time.

## 2022-03-11 NOTE — DISCHARGE SUMMARY
[facility]  DISCHARGE SUMMARY    Patient Name: Danna Dickinson  : 1950  MRN: [id number]    Date of Admission: 3/8/2022 11:54 AM  Date of Discharge: 22  Primary Care Physician: Alvarado Ferrell MD    Consults     Date and Time Order Name Status Description    3/8/2022 12:55 PM Inpatient Neurology Consult Stroke      3/8/2022 11:55 AM Inpatient Neurology Consult Stroke            Hospital Course     Presenting Problem:   Acute right-sided weakness [R53.1]    Active Hospital Problems    Diagnosis  POA   • Acute right-sided weakness [R53.1]  Yes   • DM2 (diabetes mellitus, type 2) (HCC) [E11.9]  Yes   • Essential hypertension [I10]  Yes      Resolved Hospital Problems   No resolved problems to display.          Hospital Course:  Danna Dickinson is a 72 y.o. female with a past medical history of DM2 and hypertension that presented to the ED on 3/8/2022 complaining of right sided weakness, right facial droop and slurred speech. Patient found to have a lacunar infarct. MRI of the brain revealed small acute lacunar infarct of the left posterior limb internal capsule. TTE with bubble study was negative for PFO. Neurology recommended to continue DAPT with ASA 81 mg and Plavix 75 mg daily for 21 days then  mg daily alone. She will continue Atorvastatin 80 mg daily.  has arranged for inpatient PT/OT at [facility]. She will follow up with Neurology in 4-6 weeks.  The patient will discharge today to [facility] today in stable condition.     Acute CVA  Acute right-sided weakness/right facial droop  - stroke/neuro following  - MRI brain w/o: Small acute lacunar infarct of the left posterior limb internal capsule  - TTE with bubble study- saline test negative. Ef 66-70%    - Aspirin 81mg PO daily started  - Loaded patient with 300mg of Plavix PO and then patient to take 75mg PO daily x 21 days with Aspirin 81mg, followed by Aspirin 325mg PO daily ongoing  - PT/OT recommended  inpatient therapy      HTN  - restart home Losartan     HLD  -started on Atorvastatin 80mg PO nightly      DM2  - Resume home regimen   - HgA1C: 6.5    Discharge Follow Up Recommendations for outpatient labs/diagnostics:  Follow up with PCP in 1 week.   Follow up with Neurology in 4-6 weeks.     Day of Discharge     HPI:   Seen resting in bed no apparent distress.  No acute events overnight per nursing.  She continues to have right-sided weakness.  She is aware of plan to discharge to [facility] for inpatient rehab today and is agreeable.  We discussed the importance of taking all medications as prescribed and keeping all recommended follow-up appointments.    Review of Systems  Gen- No fevers, chills  CV- No chest pain, palpitations  Resp- No cough, dyspnea  GI- No N/V/D, abd pain    Vital Signs:   Temp:  [97.9 °F (36.6 °C)-98.3 °F (36.8 °C)] 98.2 °F (36.8 °C)  Heart Rate:  [56-98] 79  Resp:  [16] 16  BP: (144-176)/(68-99) 152/68      Physical Exam:  Constitutional: No acute distress, awake, alert  HENT: NCAT, mucous membranes moist  Respiratory: Clear to auscultation bilaterally, respiratory effort normal   Cardiovascular: RRR, no murmurs, cap refill brisk   Gastrointestinal: Positive bowel sounds, soft, nontender, nondistended  Musculoskeletal: No BLE edema   Psychiatric: Appropriate affect, cooperative  Neurologic: Oriented x 3, right-sided weakness, mild dysarthria  Skin: warm, dry, no visible rash     Pertinent  and/or Most Recent Results     LAB RESULTS:      Lab 03/10/22  0548 03/09/22  0528 03/08/22  1316 03/08/22  1208 03/08/22  1206   WBC 9.49 9.65 10.35  --   --    HEMOGLOBIN 12.5 12.3 12.9  --   --    HEMOGLOBIN, POC  --   --   --   --  13.3   HEMATOCRIT 36.7 36.6 36.9  --   --    HEMATOCRIT POC  --   --   --   --  39   PLATELETS 373 370 356  --   --    NEUTROS ABS  --   --  7.93*  --   --    IMMATURE GRANS (ABS)  --   --  0.05  --   --    LYMPHS ABS  --   --  1.65  --   --    MONOS ABS  --   --   0.62  --   --    EOS ABS  --   --  0.05  --   --    MCV 86.4 88.2 86.4  --   --    APTT  --   --   --  24.5  --          Lab 03/10/22  0548 03/09/22  1058 03/09/22  0528 03/08/22  1205   SODIUM 139 141  --   --    POTASSIUM 4.1 4.1  --   --    CHLORIDE 104 103  --   --    CO2 23.0 25.0  --   --    ANION GAP 12.0 13.0  --   --    BUN 18 18  --   --    CREATININE 0.88 0.93  --  1.00   EGFR 70.8 66.3  --   --    GLUCOSE 130* 145*  --   --    CALCIUM 9.2 9.5  --   --    HEMOGLOBIN A1C  --   --  6.50*  --    TSH  --  1.850  --   --          Lab 03/10/22  0548 03/09/22  1058 03/08/22  1316   TOTAL PROTEIN 6.7 7.1  --    ALBUMIN 3.70 3.90  --    GLOBULIN 3.0 3.2  --    ALT (SGPT) 10 11 10   AST (SGOT) 14 12 10   BILIRUBIN 0.3 0.3  --    ALK PHOS 90 93  --          Lab 03/08/22  1316   TROPONIN T <0.010         Lab 03/09/22  1058   CHOLESTEROL 195   LDL CHOL 112*   HDL CHOL 55   TRIGLYCERIDES 160*             Lab 03/08/22  1206   PH, ARTERIAL 7.47     Brief Urine Lab Results     None        Microbiology Results (last 10 days)     ** No results found for the last 240 hours. **          Adult Transthoracic Echo Complete W/ Cont if Necessary Per Protocol (With Agitated Saline)    Result Date: 3/9/2022  · Saline test results are negative. · Left ventricular wall thickness is consistent with mild concentric hypertrophy. Sigmoid-shaped ventricular septum is present. · Estimated left ventricular EF was in agreement with the calculated left ventricular EF. Left ventricular ejection fraction appears to be 66 - 70%. · Estimated right ventricular systolic pressure from tricuspid regurgitation is normal (<35 mmHg). Calculated right ventricular systolic pressure from tricuspid regurgitation is 27 mmHg. · Left ventricular diastolic function is consistent with age. · There is an abnormal LVOT contour but no evidence of an outflow tract obstruction      CT Angiogram Neck    Result Date: 3/8/2022  DATE OF EXAM: 3/8/2022 12:07 PM  PROCEDURE:  CT CEREBRAL PERFUSION W WO CONTRAST-, CT ANGIOGRAM NECK-, CT ANGIOGRAM HEAD W AI ANALYSIS OF LVO-, CT HEAD WO CONTRAST STROKE PROTOCOL-  INDICATIONS: Neuro deficit, acute, stroke suspected  COMPARISON: No comparisons available.  TECHNIQUE: Routine transaxial cuts were obtained through the head without administration of contrast. Routine transaxial cuts were then obtained through the head following the intravenous administration of 125 mL of Isovue 300. Core blood volume, core blood flow, mean transit time, and Tmax images were obtained utilizing the Rapid software protocol. A limited CT angiogram of the head was also performed to measure the blood vessel density. Additional postcontrast images were obtained through the head and neck.3-D volume rendered reconstructed images were created and reviewed along with the source images  The radiation dose reduction device was turned on for each scan per the ALARA (As Low as Reasonably Achievable) protocol.  FINDINGS: CT head: There are suggested white matter changes involving the cerebral hemispheres that could relate to more chronic small vessel ischemic change. Looks like there may be an old lacunar type infarct in the right basal ganglia. There some hard plaque noted in the area of the left M1 segment. Perfusion:  CBF less than 30% 0 cc Tmax greater than 6 seconds: 0 cc Mismatch volume 0 cc  CTA head and neck: There is some hard plaque in the area of the carotid bulbs. There does not appear to be a significant stenosis by NASCET criteria. The vertebral arteries seem codominant. On evaluation of the intracranial vasculature the basilar artery is grossly unremarkable. There is somewhat of a beading appearance to the right posterior cerebral artery. The findings do result in some narrowing of the vessel particularly P2 segment. This finding is nonspecific. This could be seen with a vasculitis or fibromuscular dysplasia. The left posterior cerebral artery seems patent without  a similar appearance. The anterior cerebral arteries and right middle cerebral artery are grossly unremarkable. There is hard plaque seen involving the M1 segment on the left. This does result in at least some underlying narrowing in this area. It looks like there additionally some narrowing of the more distal left middle cerebral artery around the M2 area.  Nonvascular findings: There are radiopaque densities in the preseptal left periorbital area and along the scalp in the left frontal region. There is asymmetry to the appearance of the vallecula which is less aerated as compared to the right side of uncertain significance. It looks like there is an old alyse 's fracture involving the spinous process of C7.      1.  No significant perfusion abnormality. 2.  Abnormal appearance to the right posterior cerebral artery that might be reflective of a vasculitis or fibromuscular dysplasia. 3.  Hard plaque in the area of the left M1 segment resulting in some narrowing within this area. There additionally is narrowing of the proximal left M2 segment. 4.  Asymmetry in appearance of the vallecula on the left as compared to the right of questionable significance. 5.  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  White matter changes involving the cerebral hemispheres which could reflect more chronic small vessel ischemic change. It looks like there is old lacunar type infarction right basal ganglia. 7.  There are densities noted within the scalp in the left frontal area and in the preseptal left periorbital region that could reflect small foreign bodies and may relate to old trauma.  This report was finalized on 3/8/2022 12:52 PM by Kevin Olson MD.      MRI Brain Without Contrast    Result Date: 3/8/2022  DATE OF EXAM: 3/8/2022 4:00 PM  PROCEDURE: MRI BRAIN WO CONTRAST-  INDICATIONS: Stroke, follow up; R53.1-Weakness  COMPARISON: CT head earlier the same day  TECHNIQUE: Multiplanar multisequence images of the brain were  performed without contrast according to routine brain MRI protocol.  FINDINGS: There is a tiny area of diffusion restriction along the left posterior limb internal capsule concerning for small acute lacunar infarct. There is no evidence of associated hemorrhage or significant mass effect. Midline structures are unremarkable and the craniocervical junction is satisfactory in appearance. Fairly advanced age-related changes are otherwise noted with moderate generalized volume loss in addition to T2 hyperintense periventricular white matter changes of chronic small vessel ischemia. There is otherwise no evidence of intracranial hemorrhage, mass or mass effect. There is ex vacuo prominence of the lateral ventricles related to surrounding volume loss. The orbits are unremarkable and the paranasal sinuses are grossly clear.      Small acute lacunar infarct of the left posterior limb internal capsule. No additional territorial ischemia. No associated hemorrhage or mass effect.  Relatively advanced age-related changes otherwise present as above.  This report was finalized on 3/8/2022 4:26 PM by Wilfrid Da Silva.      XR Chest 1 View    Result Date: 3/8/2022  DATE OF EXAM: 3/8/2022 12:47 PM  PROCEDURE: XR CHEST 1 VW-  INDICATIONS: Acute Stroke Protocol (onset < 12 hrs)  COMPARISON: No comparisons available.  TECHNIQUE: Single radiographic AP view of the chest was obtained.  FINDINGS: The heart is not definitely enlarged. It looks like there is a hiatal hernia. There are no infiltrates or obvious pleural effusions. The visualized osseous structures do not appear unusual.      1.  There is some cardiomegaly. 2.  Suggested hiatal hernia.  This report was finalized on 3/8/2022 12:59 PM by Kevin Olson MD.      CT Head Without Contrast Stroke Protocol    Result Date: 3/8/2022  DATE OF EXAM: 3/8/2022 12:07 PM  PROCEDURE: CT CEREBRAL PERFUSION W WO CONTRAST-, CT ANGIOGRAM NECK-, CT ANGIOGRAM HEAD W AI ANALYSIS OF LVO-, CT HEAD WO  CONTRAST STROKE PROTOCOL-  INDICATIONS: Neuro deficit, acute, stroke suspected  COMPARISON: No comparisons available.  TECHNIQUE: Routine transaxial cuts were obtained through the head without administration of contrast. Routine transaxial cuts were then obtained through the head following the intravenous administration of 125 mL of Isovue 300. Core blood volume, core blood flow, mean transit time, and Tmax images were obtained utilizing the Rapid software protocol. A limited CT angiogram of the head was also performed to measure the blood vessel density. Additional postcontrast images were obtained through the head and neck.3-D volume rendered reconstructed images were created and reviewed along with the source images  The radiation dose reduction device was turned on for each scan per the ALARA (As Low as Reasonably Achievable) protocol.  FINDINGS: CT head: There are suggested white matter changes involving the cerebral hemispheres that could relate to more chronic small vessel ischemic change. Looks like there may be an old lacunar type infarct in the right basal ganglia. There some hard plaque noted in the area of the left M1 segment. Perfusion:  CBF less than 30% 0 cc Tmax greater than 6 seconds: 0 cc Mismatch volume 0 cc  CTA head and neck: There is some hard plaque in the area of the carotid bulbs. There does not appear to be a significant stenosis by NASCET criteria. The vertebral arteries seem codominant. On evaluation of the intracranial vasculature the basilar artery is grossly unremarkable. There is somewhat of a beading appearance to the right posterior cerebral artery. The findings do result in some narrowing of the vessel particularly P2 segment. This finding is nonspecific. This could be seen with a vasculitis or fibromuscular dysplasia. The left posterior cerebral artery seems patent without a similar appearance. The anterior cerebral arteries and right middle cerebral artery are grossly  unremarkable. There is hard plaque seen involving the M1 segment on the left. This does result in at least some underlying narrowing in this area. It looks like there additionally some narrowing of the more distal left middle cerebral artery around the M2 area.  Nonvascular findings: There are radiopaque densities in the preseptal left periorbital area and along the scalp in the left frontal region. There is asymmetry to the appearance of the vallecula which is less aerated as compared to the right side of uncertain significance. It looks like there is an old alyse 's fracture involving the spinous process of C7.      1.  No significant perfusion abnormality. 2.  Abnormal appearance to the right posterior cerebral artery that might be reflective of a vasculitis or fibromuscular dysplasia. 3.  Hard plaque in the area of the left M1 segment resulting in some narrowing within this area. There additionally is narrowing of the proximal left M2 segment. 4.  Asymmetry in appearance of the vallecula on the left as compared to the right of questionable significance. 5.  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  White matter changes involving the cerebral hemispheres which could reflect more chronic small vessel ischemic change. It looks like there is old lacunar type infarction right basal ganglia. 7.  There are densities noted within the scalp in the left frontal area and in the preseptal left periorbital region that could reflect small foreign bodies and may relate to old trauma.  This report was finalized on 3/8/2022 12:52 PM by Kevin Olson MD.      CT Angiogram Head w AI Analysis of LVO    Result Date: 3/8/2022  DATE OF EXAM: 3/8/2022 12:07 PM  PROCEDURE: CT CEREBRAL PERFUSION W WO CONTRAST-, CT ANGIOGRAM NECK-, CT ANGIOGRAM HEAD W AI ANALYSIS OF LVO-, CT HEAD WO CONTRAST STROKE PROTOCOL-  INDICATIONS: Neuro deficit, acute, stroke suspected  COMPARISON: No comparisons available.  TECHNIQUE: Routine transaxial cuts were  obtained through the head without administration of contrast. Routine transaxial cuts were then obtained through the head following the intravenous administration of 125 mL of Isovue 300. Core blood volume, core blood flow, mean transit time, and Tmax images were obtained utilizing the Rapid software protocol. A limited CT angiogram of the head was also performed to measure the blood vessel density. Additional postcontrast images were obtained through the head and neck.3-D volume rendered reconstructed images were created and reviewed along with the source images  The radiation dose reduction device was turned on for each scan per the ALARA (As Low as Reasonably Achievable) protocol.  FINDINGS: CT head: There are suggested white matter changes involving the cerebral hemispheres that could relate to more chronic small vessel ischemic change. Looks like there may be an old lacunar type infarct in the right basal ganglia. There some hard plaque noted in the area of the left M1 segment. Perfusion:  CBF less than 30% 0 cc Tmax greater than 6 seconds: 0 cc Mismatch volume 0 cc  CTA head and neck: There is some hard plaque in the area of the carotid bulbs. There does not appear to be a significant stenosis by NASCET criteria. The vertebral arteries seem codominant. On evaluation of the intracranial vasculature the basilar artery is grossly unremarkable. There is somewhat of a beading appearance to the right posterior cerebral artery. The findings do result in some narrowing of the vessel particularly P2 segment. This finding is nonspecific. This could be seen with a vasculitis or fibromuscular dysplasia. The left posterior cerebral artery seems patent without a similar appearance. The anterior cerebral arteries and right middle cerebral artery are grossly unremarkable. There is hard plaque seen involving the M1 segment on the left. This does result in at least some underlying narrowing in this area. It looks like there  additionally some narrowing of the more distal left middle cerebral artery around the M2 area.  Nonvascular findings: There are radiopaque densities in the preseptal left periorbital area and along the scalp in the left frontal region. There is asymmetry to the appearance of the vallecula which is less aerated as compared to the right side of uncertain significance. It looks like there is an old alyse 's fracture involving the spinous process of C7.      1.  No significant perfusion abnormality. 2.  Abnormal appearance to the right posterior cerebral artery that might be reflective of a vasculitis or fibromuscular dysplasia. 3.  Hard plaque in the area of the left M1 segment resulting in some narrowing within this area. There additionally is narrowing of the proximal left M2 segment. 4.  Asymmetry in appearance of the vallecula on the left as compared to the right of questionable significance. 5.  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  White matter changes involving the cerebral hemispheres which could reflect more chronic small vessel ischemic change. It looks like there is old lacunar type infarction right basal ganglia. 7.  There are densities noted within the scalp in the left frontal area and in the preseptal left periorbital region that could reflect small foreign bodies and may relate to old trauma.  This report was finalized on 3/8/2022 12:52 PM by Kevin Olson MD.      CT CEREBRAL PERFUSION WITH & WITHOUT CONTRAST    Result Date: 3/8/2022  DATE OF EXAM: 3/8/2022 12:07 PM  PROCEDURE: CT CEREBRAL PERFUSION W WO CONTRAST-, CT ANGIOGRAM NECK-, CT ANGIOGRAM HEAD W AI ANALYSIS OF LVO-, CT HEAD WO CONTRAST STROKE PROTOCOL-  INDICATIONS: Neuro deficit, acute, stroke suspected  COMPARISON: No comparisons available.  TECHNIQUE: Routine transaxial cuts were obtained through the head without administration of contrast. Routine transaxial cuts were then obtained through the head following the intravenous  administration of 125 mL of Isovue 300. Core blood volume, core blood flow, mean transit time, and Tmax images were obtained utilizing the Rapid software protocol. A limited CT angiogram of the head was also performed to measure the blood vessel density. Additional postcontrast images were obtained through the head and neck.3-D volume rendered reconstructed images were created and reviewed along with the source images  The radiation dose reduction device was turned on for each scan per the ALARA (As Low as Reasonably Achievable) protocol.  FINDINGS: CT head: There are suggested white matter changes involving the cerebral hemispheres that could relate to more chronic small vessel ischemic change. Looks like there may be an old lacunar type infarct in the right basal ganglia. There some hard plaque noted in the area of the left M1 segment. Perfusion:  CBF less than 30% 0 cc Tmax greater than 6 seconds: 0 cc Mismatch volume 0 cc  CTA head and neck: There is some hard plaque in the area of the carotid bulbs. There does not appear to be a significant stenosis by NASCET criteria. The vertebral arteries seem codominant. On evaluation of the intracranial vasculature the basilar artery is grossly unremarkable. There is somewhat of a beading appearance to the right posterior cerebral artery. The findings do result in some narrowing of the vessel particularly P2 segment. This finding is nonspecific. This could be seen with a vasculitis or fibromuscular dysplasia. The left posterior cerebral artery seems patent without a similar appearance. The anterior cerebral arteries and right middle cerebral artery are grossly unremarkable. There is hard plaque seen involving the M1 segment on the left. This does result in at least some underlying narrowing in this area. It looks like there additionally some narrowing of the more distal left middle cerebral artery around the M2 area.  Nonvascular findings: There are radiopaque densities in  the preseptal left periorbital area and along the scalp in the left frontal region. There is asymmetry to the appearance of the vallecula which is less aerated as compared to the right side of uncertain significance. It looks like there is an old alyse 's fracture involving the spinous process of C7.      1.  No significant perfusion abnormality. 2.  Abnormal appearance to the right posterior cerebral artery that might be reflective of a vasculitis or fibromuscular dysplasia. 3.  Hard plaque in the area of the left M1 segment resulting in some narrowing within this area. There additionally is narrowing of the proximal left M2 segment. 4.  Asymmetry in appearance of the vallecula on the left as compared to the right of questionable significance. 5.  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  White matter changes involving the cerebral hemispheres which could reflect more chronic small vessel ischemic change. It looks like there is old lacunar type infarction right basal ganglia. 7.  There are densities noted within the scalp in the left frontal area and in the preseptal left periorbital region that could reflect small foreign bodies and may relate to old trauma.  This report was finalized on 3/8/2022 12:52 PM by Kevin Olson MD.                Results for orders placed during the hospital encounter of 03/08/22    Adult Transthoracic Echo Complete W/ Cont if Necessary Per Protocol (With Agitated Saline)    Interpretation Summary  · Saline test results are negative.  · Left ventricular wall thickness is consistent with mild concentric hypertrophy. Sigmoid-shaped ventricular septum is present.  · Estimated left ventricular EF was in agreement with the calculated left ventricular EF. Left ventricular ejection fraction appears to be 66 - 70%.  · Estimated right ventricular systolic pressure from tricuspid regurgitation is normal (<35 mmHg). Calculated right ventricular systolic pressure from tricuspid regurgitation is 27  mmHg.  · Left ventricular diastolic function is consistent with age.  · There is an abnormal LVOT contour but no evidence of an outflow tract obstruction      Plan for Follow-up of Pending Labs/Results: Follow-up with PCP in 1 week.    Discharge Details        Discharge Medications      New Medications      Instructions Start Date   aspirin 81 MG chewable tablet   81 mg, Oral, Daily   Start Date: March 12, 2022     aspirin  MG tablet   325 mg, Oral, Daily   Start Date: April 2, 2022     atorvastatin 80 MG tablet  Commonly known as: LIPITOR   80 mg, Oral, Nightly      clopidogrel 75 MG tablet  Commonly known as: PLAVIX   75 mg, Oral, Daily   Start Date: March 12, 2022     pantoprazole 40 MG EC tablet  Commonly known as: PROTONIX  Replaces: omeprazole 20 MG capsule   40 mg, Oral, Every Morning   Start Date: March 12, 2022        Continue These Medications      Instructions Start Date   cholecalciferol 25 MCG (1000 UT) tablet  Commonly known as: VITAMIN D3   2,000 Units, Oral, Daily      glipizide 2.5 MG 24 hr tablet  Commonly known as: GLUCOTROL XL   2.5 mg, Oral, Daily      hydroCHLOROthiazide 12.5 MG tablet  Commonly known as: HYDRODIURIL   12.5 mg, Oral, Daily      losartan 50 MG tablet  Commonly known as: COZAAR   50 mg, Oral, Daily      zolpidem 5 MG tablet  Commonly known as: AMBIEN   5 mg, Oral, Nightly PRN         Stop These Medications    omeprazole 20 MG capsule  Commonly known as: priLOSEC  Replaced by: pantoprazole 40 MG EC tablet            Allergies   Allergen Reactions   • Lisinopril Cough         Discharge Disposition:  Rehab Facility or Unit (DC - External)    Diet:  Hospital:  Diet Order   Procedures   • Diet Regular; Thin; Consistent Carbohydrate       Activity:  Activity Instructions     Activity as Tolerated               CODE STATUS:    Code Status and Medical Interventions:   Ordered at: 03/08/22 1432     Level Of Support Discussed With:    Patient     Code Status (Patient has no pulse and  is not breathing):    CPR (Attempt to Resuscitate)     Medical Interventions (Patient has pulse or is breathing):    Full Support       No future appointments.    Additional Instructions for the Follow-ups that You Need to Schedule     Discharge Follow-up with PCP   As directed       Currently Documented PCP:    Alvarado Ferrell MD    PCP Phone Number:    [phone]     Follow Up Details: Follow up with PCP in 1 week.         Discharge Follow-up with Specified Provider: Follow up with Neurology in 4-6 weeks.   As directed      To: Follow up with Neurology in 4-6 weeks.                     Jai Maldonado, VALENCIA  03/11/22      Time Spent on Discharge:  I spent  39 minutes on this discharge activity which included: face-to-face encounter with the patient, reviewing the data in the system, coordination of the care with the nursing staff as well as consultants, documentation, and entering orders.

## 2022-03-21 ENCOUNTER — TELEPHONE (OUTPATIENT)
Dept: NEUROLOGY | Facility: OTHER | Age: 72
End: 2022-03-21

## 2022-04-13 ENCOUNTER — OFFICE VISIT (OUTPATIENT)
Dept: NEUROLOGY | Facility: CLINIC | Age: 72
End: 2022-04-13

## 2022-04-13 VITALS
HEART RATE: 72 BPM | DIASTOLIC BLOOD PRESSURE: 70 MMHG | WEIGHT: 185 LBS | HEIGHT: 60 IN | RESPIRATION RATE: 14 BRPM | SYSTOLIC BLOOD PRESSURE: 132 MMHG | BODY MASS INDEX: 36.32 KG/M2 | OXYGEN SATURATION: 97 %

## 2022-04-13 DIAGNOSIS — Z86.73 HISTORY OF STROKE: Primary | ICD-10-CM

## 2022-04-13 PROCEDURE — 99214 OFFICE O/P EST MOD 30 MIN: CPT | Performed by: PSYCHIATRY & NEUROLOGY

## 2022-07-13 ENCOUNTER — OFFICE VISIT (OUTPATIENT)
Dept: NEUROLOGY | Facility: CLINIC | Age: 72
End: 2022-07-13

## 2022-07-13 VITALS
DIASTOLIC BLOOD PRESSURE: 74 MMHG | SYSTOLIC BLOOD PRESSURE: 128 MMHG | HEART RATE: 75 BPM | HEIGHT: 60 IN | WEIGHT: 185 LBS | BODY MASS INDEX: 36.32 KG/M2 | OXYGEN SATURATION: 97 %

## 2022-07-13 DIAGNOSIS — Z86.73 HISTORY OF STROKE: Primary | ICD-10-CM

## 2022-07-13 PROCEDURE — 99214 OFFICE O/P EST MOD 30 MIN: CPT | Performed by: PSYCHIATRY & NEUROLOGY

## 2022-07-13 NOTE — PROGRESS NOTES
Subjective:    CC: Danna Dickinson is in clinic today for follow up for history of stroke.    HPI:  Initial visit: 4/13/2022: Patient is a 72-year-old female with past medical history of hypertension, type 2 diabetes had sudden onset of right hemibody weakness, right facial droop on March 8, 2022.  She was brought to the hospital for further evaluation.  She was beyond 4.5-hour window for IV thrombolytic therapy so she was not considered for the same.  She was admitted and underwent MRI of the brain which I reviewed personally.  It showed tiny/lacunar stroke involving the posterior limb of left internal capsule.  She underwent CT angiogram of brain which showed plaque involving the left M1 and M2 segments without any occlusion or stenosis.  CT angiogram of neck did not reveal any abnormalities.  2D echocardiogram was normal also.  She was eventually discharged to [facility] inpatient rehab with dual antiplatelet treatment for 21 days then aspirin 325 mg daily.  She is currently on aspirin 325 mg daily along with Lipitor 80 mg daily.  She has completed inpatient rehab and currently receiving outpatient physical therapy.  She reports improvement in right lower extremity strength more than right upper extremity strength.  She is able to walk with the help of a walker.    Follow-up: 7/13/2022: She is in clinic for regular follow-up.  Since her initial visit in April 2022, she reports that right-sided strength is improved further.  She is currently doing outpatient physical therapy and seems to have helped even more.  She continues to take aspirin 325 mg daily, Lipitor 80 mg daily without any new or recurrent strokelike symptoms.    The following portions of the patient's history were reviewed and updated as of 07/13/2022: allergies, social history and problem list.       Current Outpatient Medications:   •  aspirin  MG tablet, Take 1 tablet by mouth Daily., Disp: , Rfl:   •  atorvastatin (LIPITOR) 80 MG tablet,  "Take 1 tablet by mouth Every Night., Disp: , Rfl:   •  cholecalciferol (VITAMIN D3) 25 MCG (1000 UT) tablet, Take 2,000 Units by mouth Daily., Disp: , Rfl:   •  glipizide (GLUCOTROL XL) 2.5 MG 24 hr tablet, Take 2.5 mg by mouth 2 (Two) Times a Day., Disp: , Rfl:   •  hydroCHLOROthiazide (HYDRODIURIL) 12.5 MG tablet, Take 12.5 mg by mouth Daily., Disp: , Rfl:   •  losartan (COZAAR) 50 MG tablet, Take 50 mg by mouth Daily., Disp: , Rfl:   •  zolpidem (AMBIEN) 5 MG tablet, Take 1 tablet by mouth At Night As Needed for Sleep., Disp: , Rfl:    Past Medical History:   Diagnosis Date   • Diabetes mellitus (HCC)    • Hypertension       Past Surgical History:   Procedure Laterality Date   • APPENDECTOMY     • HYSTERECTOMY     • TONSILLECTOMY        Family History   Problem Relation Age of Onset   • Heart attack Mother    • Hypertension Mother    • Heart disease Mother    • Stroke Mother    • COPD Father    • Stroke Maternal Uncle    • Stroke Paternal Aunt         Review of Systems  Objective:    /74   Pulse 75   Ht 152.4 cm (60\")   Wt 83.9 kg (185 lb)   SpO2 97%   BMI 36.13 kg/m²     Neurology Exam:  General apperance: NAD.     Mental status: Alert, awake and oriented to time place and person.    Recent and Remote memory: Can recall 3/3 objects at 5 minutes. Can recall historical events.     Attention span and Concentration: Serial 7s: Normal.     Fund of knowledge:  Normal.     Language and Speech: No aphasia or dysarthria.    Naming , Repitition and Comprehension:  Can name objects, repeat a sentence and follow commands. Speech is clear and fluent with good repetition, comprehension, and naming.    CN II to XII: Intact.    Opthalmoscopic Exam: No papilledema.    Motor:  Right UE muscle strength 5/5. Normal tone.     Left UE muscle strength 5/5. Normal tone.      Right LE muscle strength5/5. Normal tone.     Left LE muscle strength 5/5. Normal tone.      Sensory: Normal light touch, vibration and pinprick " sensation bilaterally.    DTRs: 2+ bilaterally.    Babinski: Negative bilaterally.    Co-ordination: Normal finger-to-nose, heel to shin B/L.    Rhomberg: Negative.    Gait: Normal.    Cardiovascular: Regular rate and rhythm without murmur, gallop or rub.    Assessment and Plan:  1. History of stroke  Patient history of tiny left posterior limb of internal capsule stroke in March 2022.  She has had almost complete resolution of right-sided weakness and feels almost back to her baseline.  Continue with outpatient physical therapy per schedule.  Once physical therapy is completed, I have advised her to continue doing exercises at home and I will see her back in 1 year for follow-up.       I spent 30 minutes in patient care: Reviewing records prior to the visit, entering orders and documentation and spent more than baldwin 50% of this time face-to-face in management, instructions and education regarding above mentioned diagnosis and also on counseling and discussing about taking medication regularly, possible side effects with medication use, importance of good sleep hygiene, good hydration and regular exercise.    Return in about 1 year (around 7/13/2023).